# Patient Record
Sex: MALE | Race: WHITE | NOT HISPANIC OR LATINO
[De-identification: names, ages, dates, MRNs, and addresses within clinical notes are randomized per-mention and may not be internally consistent; named-entity substitution may affect disease eponyms.]

---

## 2017-11-03 PROBLEM — Z00.00 ENCOUNTER FOR PREVENTIVE HEALTH EXAMINATION: Status: ACTIVE | Noted: 2017-11-03

## 2017-11-10 ENCOUNTER — APPOINTMENT (OUTPATIENT)
Dept: CT IMAGING | Facility: IMAGING CENTER | Age: 82
End: 2017-11-10
Payer: MEDICARE

## 2017-11-10 ENCOUNTER — OUTPATIENT (OUTPATIENT)
Dept: OUTPATIENT SERVICES | Facility: HOSPITAL | Age: 82
LOS: 1 days | End: 2017-11-10
Payer: MEDICARE

## 2017-11-10 ENCOUNTER — APPOINTMENT (OUTPATIENT)
Dept: NUCLEAR MEDICINE | Facility: IMAGING CENTER | Age: 82
End: 2017-11-10
Payer: MEDICARE

## 2017-11-10 DIAGNOSIS — Z00.8 ENCOUNTER FOR OTHER GENERAL EXAMINATION: ICD-10-CM

## 2017-11-10 PROCEDURE — 78306 BONE IMAGING WHOLE BODY: CPT

## 2017-11-10 PROCEDURE — 74177 CT ABD & PELVIS W/CONTRAST: CPT | Mod: 26

## 2017-11-10 PROCEDURE — 78306 BONE IMAGING WHOLE BODY: CPT | Mod: 26

## 2017-11-10 PROCEDURE — 74177 CT ABD & PELVIS W/CONTRAST: CPT

## 2017-11-10 PROCEDURE — A9561: CPT

## 2022-04-12 ENCOUNTER — APPOINTMENT (OUTPATIENT)
Dept: ORTHOPEDIC SURGERY | Facility: CLINIC | Age: 87
End: 2022-04-12

## 2022-10-03 ENCOUNTER — NON-APPOINTMENT (OUTPATIENT)
Age: 87
End: 2022-10-03

## 2022-10-24 ENCOUNTER — NON-APPOINTMENT (OUTPATIENT)
Age: 87
End: 2022-10-24

## 2023-01-27 ENCOUNTER — INPATIENT (INPATIENT)
Facility: HOSPITAL | Age: 88
LOS: 13 days | Discharge: HOME HEALTH | DRG: 950 | End: 2023-02-10
Attending: INTERNAL MEDICINE | Admitting: STUDENT IN AN ORGANIZED HEALTH CARE EDUCATION/TRAINING PROGRAM
Payer: MEDICARE

## 2023-01-27 VITALS
HEART RATE: 100 BPM | OXYGEN SATURATION: 94 % | WEIGHT: 160.72 LBS | RESPIRATION RATE: 18 BRPM | TEMPERATURE: 100 F | DIASTOLIC BLOOD PRESSURE: 78 MMHG | HEIGHT: 65 IN | SYSTOLIC BLOOD PRESSURE: 132 MMHG

## 2023-01-27 DIAGNOSIS — M51.16 INTERVERTEBRAL DISC DISORDERS WITH RADICULOPATHY, LUMBAR REGION: ICD-10-CM

## 2023-01-27 LAB
GLUCOSE BLDC GLUCOMTR-MCNC: 174 MG/DL — HIGH (ref 70–99)
SARS-COV-2 RNA SPEC QL NAA+PROBE: SIGNIFICANT CHANGE UP

## 2023-01-27 RX ORDER — POLYETHYLENE GLYCOL 3350 17 G/17G
17 POWDER, FOR SOLUTION ORAL DAILY
Refills: 0 | Status: DISCONTINUED | OUTPATIENT
Start: 2023-01-27 | End: 2023-02-01

## 2023-01-27 RX ORDER — CHOLECALCIFEROL (VITAMIN D3) 125 MCG
2000 CAPSULE ORAL
Refills: 0 | Status: DISCONTINUED | OUTPATIENT
Start: 2023-01-27 | End: 2023-01-27

## 2023-01-27 RX ORDER — DEXTROSE 50 % IN WATER 50 %
12.5 SYRINGE (ML) INTRAVENOUS ONCE
Refills: 0 | Status: DISCONTINUED | OUTPATIENT
Start: 2023-01-27 | End: 2023-02-10

## 2023-01-27 RX ORDER — METOPROLOL TARTRATE 50 MG
25 TABLET ORAL
Refills: 0 | Status: DISCONTINUED | OUTPATIENT
Start: 2023-01-27 | End: 2023-01-27

## 2023-01-27 RX ORDER — DEXTROSE 50 % IN WATER 50 %
25 SYRINGE (ML) INTRAVENOUS ONCE
Refills: 0 | Status: DISCONTINUED | OUTPATIENT
Start: 2023-01-27 | End: 2023-02-10

## 2023-01-27 RX ORDER — ATORVASTATIN CALCIUM 80 MG/1
80 TABLET, FILM COATED ORAL AT BEDTIME
Refills: 0 | Status: DISCONTINUED | OUTPATIENT
Start: 2023-01-27 | End: 2023-02-10

## 2023-01-27 RX ORDER — INSULIN GLARGINE 100 [IU]/ML
9 INJECTION, SOLUTION SUBCUTANEOUS AT BEDTIME
Refills: 0 | Status: DISCONTINUED | OUTPATIENT
Start: 2023-01-27 | End: 2023-02-02

## 2023-01-27 RX ORDER — POLYETHYLENE GLYCOL 3350 17 G/17G
17 POWDER, FOR SOLUTION ORAL DAILY
Refills: 0 | Status: DISCONTINUED | OUTPATIENT
Start: 2023-01-27 | End: 2023-01-27

## 2023-01-27 RX ORDER — SODIUM CHLORIDE 9 MG/ML
1000 INJECTION, SOLUTION INTRAVENOUS
Refills: 0 | Status: DISCONTINUED | OUTPATIENT
Start: 2023-01-27 | End: 2023-02-10

## 2023-01-27 RX ORDER — OXYCODONE HYDROCHLORIDE 5 MG/1
5 TABLET ORAL EVERY 6 HOURS
Refills: 0 | Status: DISCONTINUED | OUTPATIENT
Start: 2023-01-27 | End: 2023-02-03

## 2023-01-27 RX ORDER — GLUCAGON INJECTION, SOLUTION 0.5 MG/.1ML
1 INJECTION, SOLUTION SUBCUTANEOUS ONCE
Refills: 0 | Status: DISCONTINUED | OUTPATIENT
Start: 2023-01-27 | End: 2023-02-10

## 2023-01-27 RX ORDER — LISINOPRIL 2.5 MG/1
10 TABLET ORAL DAILY
Refills: 0 | Status: DISCONTINUED | OUTPATIENT
Start: 2023-01-27 | End: 2023-01-27

## 2023-01-27 RX ORDER — TIZANIDINE 4 MG/1
2 TABLET ORAL EVERY 8 HOURS
Refills: 0 | Status: DISCONTINUED | OUTPATIENT
Start: 2023-01-27 | End: 2023-02-02

## 2023-01-27 RX ORDER — FAMOTIDINE 10 MG/ML
20 INJECTION INTRAVENOUS DAILY
Refills: 0 | Status: DISCONTINUED | OUTPATIENT
Start: 2023-01-27 | End: 2023-02-10

## 2023-01-27 RX ORDER — INSULIN LISPRO 100/ML
VIAL (ML) SUBCUTANEOUS AT BEDTIME
Refills: 0 | Status: DISCONTINUED | OUTPATIENT
Start: 2023-01-27 | End: 2023-02-02

## 2023-01-27 RX ORDER — ACETAMINOPHEN 500 MG
650 TABLET ORAL EVERY 6 HOURS
Refills: 0 | Status: DISCONTINUED | OUTPATIENT
Start: 2023-01-27 | End: 2023-02-10

## 2023-01-27 RX ORDER — SENNA PLUS 8.6 MG/1
2 TABLET ORAL AT BEDTIME
Refills: 0 | Status: DISCONTINUED | OUTPATIENT
Start: 2023-01-27 | End: 2023-02-06

## 2023-01-27 RX ORDER — INSULIN LISPRO 100/ML
VIAL (ML) SUBCUTANEOUS
Refills: 0 | Status: DISCONTINUED | OUTPATIENT
Start: 2023-01-27 | End: 2023-02-02

## 2023-01-27 RX ORDER — CHOLECALCIFEROL (VITAMIN D3) 125 MCG
2000 CAPSULE ORAL
Refills: 0 | Status: DISCONTINUED | OUTPATIENT
Start: 2023-01-27 | End: 2023-02-10

## 2023-01-27 RX ORDER — METOPROLOL TARTRATE 50 MG
25 TABLET ORAL
Refills: 0 | Status: DISCONTINUED | OUTPATIENT
Start: 2023-01-27 | End: 2023-02-09

## 2023-01-27 RX ORDER — LISINOPRIL 2.5 MG/1
10 TABLET ORAL DAILY
Refills: 0 | Status: DISCONTINUED | OUTPATIENT
Start: 2023-01-27 | End: 2023-02-10

## 2023-01-27 RX ORDER — LANOLIN ALCOHOL/MO/W.PET/CERES
3 CREAM (GRAM) TOPICAL AT BEDTIME
Refills: 0 | Status: DISCONTINUED | OUTPATIENT
Start: 2023-01-27 | End: 2023-01-31

## 2023-01-27 RX ORDER — HEPARIN SODIUM 5000 [USP'U]/ML
5000 INJECTION INTRAVENOUS; SUBCUTANEOUS EVERY 12 HOURS
Refills: 0 | Status: DISCONTINUED | OUTPATIENT
Start: 2023-01-27 | End: 2023-02-10

## 2023-01-27 RX ORDER — DEXTROSE 50 % IN WATER 50 %
15 SYRINGE (ML) INTRAVENOUS ONCE
Refills: 0 | Status: DISCONTINUED | OUTPATIENT
Start: 2023-01-27 | End: 2023-02-10

## 2023-01-27 RX ORDER — TAMSULOSIN HYDROCHLORIDE 0.4 MG/1
0.4 CAPSULE ORAL AT BEDTIME
Refills: 0 | Status: DISCONTINUED | OUTPATIENT
Start: 2023-01-27 | End: 2023-02-10

## 2023-01-27 RX ADMIN — Medication 25 MILLIGRAM(S): at 22:38

## 2023-01-27 RX ADMIN — TAMSULOSIN HYDROCHLORIDE 0.4 MILLIGRAM(S): 0.4 CAPSULE ORAL at 22:39

## 2023-01-27 RX ADMIN — ATORVASTATIN CALCIUM 80 MILLIGRAM(S): 80 TABLET, FILM COATED ORAL at 22:38

## 2023-01-27 RX ADMIN — OXYCODONE HYDROCHLORIDE 5 MILLIGRAM(S): 5 TABLET ORAL at 20:50

## 2023-01-27 RX ADMIN — OXYCODONE HYDROCHLORIDE 5 MILLIGRAM(S): 5 TABLET ORAL at 19:50

## 2023-01-27 RX ADMIN — SENNA PLUS 2 TABLET(S): 8.6 TABLET ORAL at 22:39

## 2023-01-27 RX ADMIN — INSULIN GLARGINE 9 UNIT(S): 100 INJECTION, SOLUTION SUBCUTANEOUS at 22:37

## 2023-01-27 NOTE — H&P ADULT - NS ATTEND AMEND GEN_ALL_CORE FT
87M with hx of HTN, CAD, prostate CA, DM2 and chronic LBP who presented with severe radicular LBP, now s/p L3-5 lumbar interbody fusion, L2-S1 posterior column osteotomy, facetectomy and foraminotomy and L3-5 PSF. Good candidate for acute rehab. Agree with plan as above. Continue Oxycodone, Tylenol and Zanaflex PRN for pain.

## 2023-01-27 NOTE — H&P ADULT - NSHPSOCIALHISTORY_GEN_ALL_CORE
Smoking -  EtOH -   Drugs -     Marital status:     Patient lives with spouse and son in a private house 3 JUSTICE  PTA: Independent in ADLs and ambulation     CURRENT FUNCTIONAL STATUS    Date: 1/27  Transfers -pt able to scoot forward and back in chair w cg of 1  sit--> stand --> Rw w min assist of 1,   stand --> sit w xg/min assist of 1, + cuing for technique and hand placement.     Ambulation - 1 x 50 ft w RW, min assist of 1, chair follow. Gait - antalgic, with decreased step length , unsteady and is notable for occasional instability L knee that pt is able to control by modifying step length of R LE.    OT:  01/26/2023  Rolling: Minimum assistance, Moderate assistance, of 2  Sit to Supine: Minimum assistance, Moderate assistance, of 2  Bed to Chair: Minimum assistance, of 2 with cues for hand placement    Armchair Transfers: Min assist, Of 2, Additional time, Adaptive equipment Smoking - Denies  EtOH - Denies  Drugs - Denies    Marital status:     Patient lives with spouse and son in a private house 3 JUSTICE  PTA: Independent in ADLs and ambulation     CURRENT FUNCTIONAL STATUS    Date: 1/27  Transfers -pt able to scoot forward and back in chair w cg of 1  sit--> stand --> Rw w min assist of 1,   stand --> sit w xg/min assist of 1, + cuing for technique and hand placement.     Ambulation - 1 x 50 ft w RW, min assist of 1, chair follow. Gait - antalgic, with decreased step length , unsteady and is notable for occasional instability L knee that pt is able to control by modifying step length of R LE.    OT:  01/26/2023  Rolling: Minimum assistance, Moderate assistance, of 2  Sit to Supine: Minimum assistance, Moderate assistance, of 2  Bed to Chair: Minimum assistance, of 2 with cues for hand placement    Armchair Transfers: Min assist, Of 2, Additional time, Adaptive equipment

## 2023-01-27 NOTE — H&P ADULT - NSHPREVIEWOFSYSTEMS_GEN_ALL_CORE
REVIEW OF SYSTEMS  Constitutional: No fever, No Chills, No fatigue  HEENT: No eye pain, No visual disturbances, No difficulty hearing  Pulm: No cough,  No shortness of breath  Cardio: No chest pain, No palpitations  GI:  No abdominal pain, No nausea, No vomiting, No diarrhea, + constipation  : No dysuria, No frequency, No hematuria  Neuro: No headaches, No memory loss, + loss of strength, no numbness, No tremors  Skin: No itching, No rashes, No lesions   Endo: No temperature intolerance  MSK: No joint pain, No joint swelling, + muscle pain, No Neck pain,  + back pain  Psych:  No depression, No anxiety

## 2023-01-27 NOTE — H&P ADULT - ASSESSMENT
ASSESSMENT/PLAN  This is an 86 YO male with PMH of HTN, HLD,  Nonobstructive CAD, prostate cancer s/p RT,  DM II, Arthritis s/p Right total knee replacement in 2008, squamous cell carcinoma of scalp, BPH, ASHD, Chronic lower back pain secondary to Lumbar stenosis with neurogenic claudication, COVID 2020 who presented to [facility] on 1/24 with c/o  lower back pain radiating down to B/L legs that has  progressively worsened and has failed conservation treatments.    MRI of lumbar spine revealed  Convex left curvature with straightening of the lordosis, multilevel loss of disc signal and height and anterior spurring and bulging from L2-L3 through L5-S1. He is s/p right posterior spine  L3-L5 extreme lateral lumbar interbody fusion, L2-S1 posterior column osteotomy, facetectomy, foraminotomy, L3-L5 posterior instrumented fusion  paraspineous muscle flap wound closure on 1/24.  Patient now with gait Instability, ADL impairments and Functional impairments.    #Lumbar Radiculopathy  - MRI of lumbar spine revealed  Convex left curvature with straightening of the lordosis, multilevel loss of disc signal and height and anterior spurring and bulging from L2-L3 through L5-S1.   - now s/p   L3-L5 extreme lateral lumbar interbody fusion, L2-S1 posterior column osteotomy, facetectomy, foraminotomy, L3-L5 posterior instrumented fusion  paraspineous muscle flap wound closure on 1/24.    - Start Comprehensive Rehab Program: PT/OT 3hours daily and 5 days weekly  - PT: Focused on improving strength, endurance, coordination, balance, functional mobility, and transfers  - OT: Focused on improving strength, fine motor skills, coordination, posture and ADLs.    - WB Status:    #DM II  - ISS and FS  - Admelog and Lantus  - A1c ..... on     #BPH  - Flomax 0.4mg daily    #Pain management  - Tylenol PRN, Oxycodone PRN    #DVT ppx  -  Heparin, SCD, TEDs    #GI ppx  - Pepcid 20mg    #Bowel Regimen  - Senna, miralax PRN    #Bladder management  - BS on admission, and q 8 hours (SC if > 400)  - Monitor UO    #FEN   - Diet: Regular. Cardiac  - Supplements:    #Skin:  - Skin on admission: ***  - Pressure injury/Skin: Turn Q2hrs while in bed, OOB to Chair, PT/OT     #Sleep:   - Maintain quiet hours and low stim environment.  - Melatonin PRN to maximize participation in therapy during the day.       #Precaution  - Fall, Aspiration, cardiac, Spinal    #GOC  CODE STATUS: FULL CODE     Outpatient Follow-up (Specialty/Name of physician):        MEDICAL PROGNOSIS: GOOD            REHAB POTENTIAL: GOOD             ESTIMATED DISPOSITION: HOME WITH HOME CARE            ELOS: 10-14 Days   EXPECTED THERAPY:     P.T. 1hr/day       O.T. 1hr/day      S.L.P. N/A    P&O Unnecessary     EXP FREQUENCY: 5 days per 7 day period     PRESCREEN COMPARISON:   I have reviewed the prescreen information and I have found no relevant changes between the preadmission screening and my post admission evaluation     RATIONALE FOR INPATIENT ADMISSION - Patient demonstrates the following: (check all that apply)  [X] Medically appropriate for rehabilitation admission  [X] Has attainable rehab goals with an appropriate initial discharge plan  [X] Has rehabilitation potential (expected to make a significant improvement within a reasonable period of time)   [X] Requires close medical management by a rehab physician, rehab nursing care, Hospitalist and comprehensive interdisciplinary team (including PT, OT, & or SLP, Prosthetics and Orthotics)   ASSESSMENT/PLAN  This is an 86 YO male with PMH of HTN, HLD,  Nonobstructive CAD, prostate cancer s/p RT,  DM II, Arthritis s/p Right total knee replacement in 2008, squamous cell carcinoma of scalp, BPH, ASHD, Chronic lower back pain secondary to Lumbar stenosis with neurogenic claudication, COVID 2020 who presented to [facility] on 1/24 with c/o  lower back pain radiating down to B/L legs that has  progressively worsened and has failed conservation treatments.    MRI of lumbar spine revealed  Convex left curvature with straightening of the lordosis, multilevel loss of disc signal and height and anterior spurring and bulging from L2-L3 through L5-S1. He is s/p right posterior spine  L3-L5 extreme lateral lumbar interbody fusion, L2-S1 posterior column osteotomy, facetectomy, foraminotomy, L3-L5 posterior instrumented fusion  paraspineous muscle flap wound closure on 1/24.  Patient now with gait Instability, ADL impairments and Functional impairments.    #Lumbar Radiculopathy  - MRI of lumbar spine revealed  Convex left curvature with straightening of the lordosis, multilevel loss of disc signal and height and anterior spurring and bulging from L2-L3 through L5-S1.   - now s/p   L3-L5 extreme lateral lumbar interbody fusion, L2-S1 posterior column osteotomy, facetectomy, foraminotomy, L3-L5 posterior instrumented fusion  paraspineous muscle flap wound closure on 1/24.  - Start Comprehensive Rehab Program: PT/OT 3hours daily and 5 days weekly  - PT: Focused on improving strength, endurance, coordination, balance, functional mobility, and transfers  - OT: Focused on improving strength, fine motor skills, coordination, posture and ADLs.      #HLD  - Lipitor 80mg daily    #HTN  - Lisinopril  10mg daily  - Metoprolol 25mg BID    #DM II  - ISS and FS  - Admelog and Lantus    #BPH  - Flomax 0.4mg daily    #Pain management  - Tylenol PRN, Oxycodone PRN, Zanaflex 2mg prn    #DVT ppx  -  Heparin, SCD, TEDs    #GI ppx  - Pepcid 20mg    #Bowel Regimen  - Senna, miralax daily    #Bladder management  - BS on admission, and q 8 hours (SC if > 400)  - Monitor UO    #FEN   - Diet: Regular. Cardiac  - Supplements vit D, calcium    #Skin:  - Skin on admission: Thoracic spine incision + 2 drain site with surgiglue, Right flank incision with surgiglue BRANDI  - Pressure injury/Skin: Turn Q2hrs while in bed, OOB to Chair, PT/OT     #Sleep:   - Maintain quiet hours and low stim environment.  - Melatonin 3mgN to maximize participation in therapy during the day.     #Precaution  - Fall, Aspiration, cardiac, thoracic spine    #GOC  CODE STATUS: FULL CODE     Outpatient Follow-up (Specialty/Name of physician):        MEDICAL PROGNOSIS: GOOD            REHAB POTENTIAL: GOOD             ESTIMATED DISPOSITION: HOME WITH HOME CARE            ELOS: 10-14 Days   EXPECTED THERAPY:     P.T. 1hr/day       O.T. 1hr/day      S.L.P. N/A    P&O Unnecessary     EXP FREQUENCY: 5 days per 7 day period     PRESCREEN COMPARISON:   I have reviewed the prescreen information and I have found no relevant changes between the preadmission screening and my post admission evaluation     RATIONALE FOR INPATIENT ADMISSION - Patient demonstrates the following: (check all that apply)  [X] Medically appropriate for rehabilitation admission  [X] Has attainable rehab goals with an appropriate initial discharge plan  [X] Has rehabilitation potential (expected to make a significant improvement within a reasonable period of time)   [X] Requires close medical management by a rehab physician, rehab nursing care, Hospitalist and comprehensive interdisciplinary team (including PT, OT, & or SLP, Prosthetics and Orthotics)

## 2023-01-27 NOTE — H&P ADULT - HISTORY OF PRESENT ILLNESS
This is an 88 YO male with PMH of HTN, HLD,  Nonobstructive CAD, prostate cancer s/p RT,  DM II, Arthritis s/p Right total knee replacement in 2008, squamous cell carcinoma of scalp, BPH, ASHD, Chronic lower back pain secondary to Lumbar stenosis with neurogenic claudication, COVID 2020. Pt reports he has chronic lower back pain radiating down to B/L legs (Left > right) for several years and progressively worsened with walking and ADLs in the past 6 months. Pt describes the pain as "sharp" rates at 9 out of 10. Conservative therapies including NSAIDs, steroid injections, physical therapy, ice/heat and rest have provided minimal relief from pain.    MRI of lumbar spine revealed  Convex left curvature with straightening of the lordosis, multilevel loss of disc signal and height and anterior spurring and bulging from L2-L3 through L5-S1. No vertebral body compression fracture.    01/24/2023, he had right posterior spine  L3-L5 extreme lateral lumbar interbody fusion, L2-S1 posterior column osteotomy, facetectomy, foraminotomy, L3-L5 posterior instrumented fusion    paraspineous muscle flap wound closure. He was extubated on 01/25/2023 and transitioned  to 3L N/C. Drain removed 01/27. Patient was evaluated by PM&R and therapy for functional deficits, gait/ADL impairments and acute rehabilitation was recommended. Patient was medically optimized for discharge to [facility] IRU on 1/27/23.

## 2023-01-27 NOTE — PATIENT PROFILE ADULT - FALL HARM RISK - HARM RISK INTERVENTIONS
Assistance with ambulation/Assistance OOB with selected safe patient handling equipment/Communicate Risk of Fall with Harm to all staff/Discuss with provider need for PT consult/Monitor gait and stability/Reinforce activity limits and safety measures with patient and family/Tailored Fall Risk Interventions/Visual Cue: Yellow wristband and red socks/Bed in lowest position, wheels locked, appropriate side rails in place/Call bell, personal items and telephone in reach/Instruct patient to call for assistance before getting out of bed or chair/Non-slip footwear when patient is out of bed/[place] to call system/Physically safe environment - no spills, clutter or unnecessary equipment/Purposeful Proactive Rounding/Room/bathroom lighting operational, light cord in reach

## 2023-01-27 NOTE — H&P ADULT - NSHPPHYSICALEXAM_GEN_ALL_CORE
PHYSICAL EXAM  VITALS  T(C): 37.5 (01-27-23 @ 18:40), Max: 37.5 (01-27-23 @ 18:40)  HR: 100 (01-27-23 @ 18:40) (100 - 100)  BP: 132/78 (01-27-23 @ 18:40) (132/78 - 132/78)  RR: 18 (01-27-23 @ 18:40) (18 - 18)  SpO2: 94% (01-27-23 @ 18:40) (94% - 94%)    Gen - NAD, Comfortable  HEENT - NCAT, EOMI, MMM  Neck - Supple, No limited ROM  Pulm - CTAB, No wheeze, No rhonchi, No crackles  Cardiovascular - RRR, S1S2  Chest - good chest expansion, good respiratory effort  Abdomen - Soft, NT/ND, +BS  Extremities - No Cyanosis, no clubbing, no edema, no calf tenderness  Neuro-     Cognitive - awake, alert, oriented to person, place, date, year, and situation.  Able to follow command     Communication - Fluent     Attention: Intact,      Judgement: Good evidence of judgement     Cranial Nerves - CN 2-12 intact.     Motor -                     LEFT    UE - 4+/5                    RIGHT UE - 4+/5                    LEFT    LE - 3-/5                    RIGHT LE - 3/5        Sensory - Intact to LT      Reflexes - DTR Intact, No primitive reflexive  Skin:  thoracic spine incision + 2 drain site with surgiglue, Right flank incision with surgiglue BRANDI

## 2023-01-28 LAB
A1C WITH ESTIMATED AVERAGE GLUCOSE RESULT: 7.6 % — HIGH (ref 4–5.6)
ALBUMIN SERPL ELPH-MCNC: 2.1 G/DL — LOW (ref 3.3–5)
ALP SERPL-CCNC: 63 U/L — SIGNIFICANT CHANGE UP (ref 40–120)
ALT FLD-CCNC: 16 U/L — SIGNIFICANT CHANGE UP (ref 10–45)
ANION GAP SERPL CALC-SCNC: 8 MMOL/L — SIGNIFICANT CHANGE UP (ref 5–17)
AST SERPL-CCNC: 35 U/L — SIGNIFICANT CHANGE UP (ref 10–40)
BASOPHILS # BLD AUTO: 0.02 K/UL — SIGNIFICANT CHANGE UP (ref 0–0.2)
BASOPHILS NFR BLD AUTO: 0.3 % — SIGNIFICANT CHANGE UP (ref 0–2)
BILIRUB SERPL-MCNC: 0.8 MG/DL — SIGNIFICANT CHANGE UP (ref 0.2–1.2)
BUN SERPL-MCNC: 12 MG/DL — SIGNIFICANT CHANGE UP (ref 7–23)
CALCIUM SERPL-MCNC: 8.3 MG/DL — LOW (ref 8.4–10.5)
CHLORIDE SERPL-SCNC: 105 MMOL/L — SIGNIFICANT CHANGE UP (ref 96–108)
CO2 SERPL-SCNC: 27 MMOL/L — SIGNIFICANT CHANGE UP (ref 22–31)
CREAT SERPL-MCNC: 0.7 MG/DL — SIGNIFICANT CHANGE UP (ref 0.5–1.3)
EGFR: 90 ML/MIN/1.73M2 — SIGNIFICANT CHANGE UP
EOSINOPHIL # BLD AUTO: 0.13 K/UL — SIGNIFICANT CHANGE UP (ref 0–0.5)
EOSINOPHIL NFR BLD AUTO: 2.1 % — SIGNIFICANT CHANGE UP (ref 0–6)
ESTIMATED AVERAGE GLUCOSE: 171 MG/DL — HIGH (ref 68–114)
GLUCOSE BLDC GLUCOMTR-MCNC: 145 MG/DL — HIGH (ref 70–99)
GLUCOSE BLDC GLUCOMTR-MCNC: 164 MG/DL — HIGH (ref 70–99)
GLUCOSE BLDC GLUCOMTR-MCNC: 175 MG/DL — HIGH (ref 70–99)
GLUCOSE BLDC GLUCOMTR-MCNC: 221 MG/DL — HIGH (ref 70–99)
GLUCOSE SERPL-MCNC: 158 MG/DL — HIGH (ref 70–99)
HCT VFR BLD CALC: 26.6 % — LOW (ref 39–50)
HGB BLD-MCNC: 8.4 G/DL — LOW (ref 13–17)
IMM GRANULOCYTES NFR BLD AUTO: 0.5 % — SIGNIFICANT CHANGE UP (ref 0–0.9)
LYMPHOCYTES # BLD AUTO: 0.78 K/UL — LOW (ref 1–3.3)
LYMPHOCYTES # BLD AUTO: 12.7 % — LOW (ref 13–44)
MCHC RBC-ENTMCNC: 30.4 PG — SIGNIFICANT CHANGE UP (ref 27–34)
MCHC RBC-ENTMCNC: 31.6 GM/DL — LOW (ref 32–36)
MCV RBC AUTO: 96.4 FL — SIGNIFICANT CHANGE UP (ref 80–100)
MONOCYTES # BLD AUTO: 0.57 K/UL — SIGNIFICANT CHANGE UP (ref 0–0.9)
MONOCYTES NFR BLD AUTO: 9.3 % — SIGNIFICANT CHANGE UP (ref 2–14)
NEUTROPHILS # BLD AUTO: 4.63 K/UL — SIGNIFICANT CHANGE UP (ref 1.8–7.4)
NEUTROPHILS NFR BLD AUTO: 75.1 % — SIGNIFICANT CHANGE UP (ref 43–77)
NRBC # BLD: 0 /100 WBCS — SIGNIFICANT CHANGE UP (ref 0–0)
PLATELET # BLD AUTO: 146 K/UL — LOW (ref 150–400)
POTASSIUM SERPL-MCNC: 3.7 MMOL/L — SIGNIFICANT CHANGE UP (ref 3.5–5.3)
POTASSIUM SERPL-SCNC: 3.7 MMOL/L — SIGNIFICANT CHANGE UP (ref 3.5–5.3)
PROT SERPL-MCNC: 5 G/DL — LOW (ref 6–8.3)
RBC # BLD: 2.76 M/UL — LOW (ref 4.2–5.8)
RBC # FLD: 17.7 % — HIGH (ref 10.3–14.5)
SODIUM SERPL-SCNC: 140 MMOL/L — SIGNIFICANT CHANGE UP (ref 135–145)
WBC # BLD: 6.16 K/UL — SIGNIFICANT CHANGE UP (ref 3.8–10.5)
WBC # FLD AUTO: 6.16 K/UL — SIGNIFICANT CHANGE UP (ref 3.8–10.5)

## 2023-01-28 PROCEDURE — 99223 1ST HOSP IP/OBS HIGH 75: CPT

## 2023-01-28 PROCEDURE — 99222 1ST HOSP IP/OBS MODERATE 55: CPT

## 2023-01-28 RX ADMIN — OXYCODONE HYDROCHLORIDE 5 MILLIGRAM(S): 5 TABLET ORAL at 13:08

## 2023-01-28 RX ADMIN — Medication 650 MILLIGRAM(S): at 23:06

## 2023-01-28 RX ADMIN — OXYCODONE HYDROCHLORIDE 5 MILLIGRAM(S): 5 TABLET ORAL at 14:08

## 2023-01-28 RX ADMIN — ATORVASTATIN CALCIUM 80 MILLIGRAM(S): 80 TABLET, FILM COATED ORAL at 22:14

## 2023-01-28 RX ADMIN — Medication 25 MILLIGRAM(S): at 06:52

## 2023-01-28 RX ADMIN — TAMSULOSIN HYDROCHLORIDE 0.4 MILLIGRAM(S): 0.4 CAPSULE ORAL at 22:15

## 2023-01-28 RX ADMIN — Medication 4: at 11:32

## 2023-01-28 RX ADMIN — Medication 650 MILLIGRAM(S): at 17:55

## 2023-01-28 RX ADMIN — Medication 25 MILLIGRAM(S): at 18:31

## 2023-01-28 RX ADMIN — Medication 1 ENEMA: at 22:16

## 2023-01-28 RX ADMIN — Medication 5 MILLIGRAM(S): at 10:21

## 2023-01-28 RX ADMIN — Medication 650 MILLIGRAM(S): at 16:55

## 2023-01-28 RX ADMIN — SENNA PLUS 2 TABLET(S): 8.6 TABLET ORAL at 23:06

## 2023-01-28 RX ADMIN — INSULIN GLARGINE 9 UNIT(S): 100 INJECTION, SOLUTION SUBCUTANEOUS at 22:14

## 2023-01-28 RX ADMIN — HEPARIN SODIUM 5000 UNIT(S): 5000 INJECTION INTRAVENOUS; SUBCUTANEOUS at 06:51

## 2023-01-28 RX ADMIN — HEPARIN SODIUM 5000 UNIT(S): 5000 INJECTION INTRAVENOUS; SUBCUTANEOUS at 18:13

## 2023-01-28 RX ADMIN — Medication 5 MILLIGRAM(S): at 18:12

## 2023-01-28 RX ADMIN — POLYETHYLENE GLYCOL 3350 17 GRAM(S): 17 POWDER, FOR SOLUTION ORAL at 11:31

## 2023-01-28 RX ADMIN — Medication 2: at 17:04

## 2023-01-28 RX ADMIN — LISINOPRIL 10 MILLIGRAM(S): 2.5 TABLET ORAL at 06:51

## 2023-01-28 RX ADMIN — FAMOTIDINE 20 MILLIGRAM(S): 10 INJECTION INTRAVENOUS at 11:31

## 2023-01-28 NOTE — CONSULT NOTE ADULT - ASSESSMENT
This is an 86 y/o M with PMH of HTN, HLD, Nonobstructive CAD, prostate cancer s/p RT, T2DM, Arthritis s/p Right total knee replacement in 2008, squamous cell carcinoma of scalp, BPH, ASHD, Chronic lower back pain secondary to Lumbar stenosis with neurogenic claudication, COVID 2020 who presented to [facility] on 1/24 with c/o lower back pain radiating down to B/L legs that has progressively worsened and has failed conservation treatments. MRI of lumbar spine revealed  Convex left curvature with straightening of the lordosis, multilevel loss of disc signal and height and anterior spurring and bulging from L2-L3 through L5-S1. He is s/p right posterior spine L3-L5 extreme lateral lumbar interbody fusion, L2-S1 posterior column osteotomy, facetectomy, foraminotomy, L3-L5 posterior instrumented fusion paraspinous muscle flap wound closure on 1/24. Patient now with gait Instability, ADL impairments and Functional impairments.    #Lumbar Radiculopathy  -MRI of lumbar spine revealed Convex left curvature with straightening of the lordosis, multilevel loss of disc signal and height and anterior spurring and bulging from L2-L3 through L5-S1  -Now s/p L3-L5 extreme lateral lumbar interbody fusion, L2-S1 posterior column osteotomy, facetectomy, foraminotomy, L3-L5 posterior instrumented fusion paraspinous muscle flap wound closure on 1/24  -Start comprehensive rehab program - PT/OT/SLP per rehab team  -Pain management, bowel regimen per rehab   -Hb 8.4, no active s/s bleeding, likely post op anemia, continue to monitor    #HLD  -Lipitor 80mg qd    #HTN  -Lisinopril 10mg qd  -Metoprolol 25mg BID    #DM II  -ISS and FS  -Lantus 9un qhs    #BPH  -Flomax 0.4mg qd    #DVT ppx - HSQ  #GI ppx - Pepcid

## 2023-01-28 NOTE — CONSULT NOTE ADULT - SUBJECTIVE AND OBJECTIVE BOX
Patient is a 87y old  Male who presents with a chief complaint of Lumbar Radiculopathy (2023 15:20)    HPI:  This is an 86 y/o M with PMH of HTN, HLD, Nonobstructive CAD, prostate cancer s/p RT, DM II, Arthritis s/p Right total knee replacement in , squamous cell carcinoma of scalp, BPH, ASHD, Chronic lower back pain secondary to Lumbar stenosis with neurogenic claudication, COVID-19 in . Pt reports he has chronic lower back pain radiating down to B/L legs (Left > right) for several years and progressively worsened with walking and ADLs in the past 6 months. Pt describes the pain as "sharp" rates at 9 out of 10. Conservative therapies including NSAIDs, steroid injections, physical therapy, ice/heat and rest have provided minimal relief from pain. MRI of lumbar spine revealed Convex left curvature with straightening of the lordosis, multilevel loss of disc signal and height and anterior spurring and bulging from L2-L3 through L5-S1. No vertebral body compression fracture.    2023, he had right posterior spine L3-L5 extreme lateral lumbar interbody fusion, L2-S1 posterior column osteotomy, facetectomy, foraminotomy, L3-L5 posterior instrumented fusion paraspineous muscle flap wound closure. He was extubated on 2023 and transitioned  to 3L N/C. Drain removed . Patient was evaluated by PM&R and therapy for functional deficits, gait/ADL impairments and acute rehabilitation was recommended. Patient was medically optimized for discharge to [facility] IRU on 23. (2023 15:20)    Patient seen and examined at bedside, stable, NAD. c/o constipation. denies headache, fever, chills, cp, sob, n/v, abd pain.    PAST MEDICAL & SURGICAL HISTORY: as above    SOCIAL HISTORY: Denies tobacco, EtOH, or illicit drug use. PTA ambulates with rolling walker. Lives in home with 3 stairs. Retired - worked with the [employer]    FAMILY HISTORY: Mother,  age 78, denies PMH. Father,  age 52, pneumonia    ALLERGIES:  clindamycin (Unknown)  gentamicin (Unknown)    MEDICATIONS  (STANDING):  atorvastatin 80 milliGRAM(s) Oral at bedtime  bisacodyl 5 milliGRAM(s) Oral every 12 hours  cholecalciferol 2000 Unit(s) Oral <User Schedule>  dextrose 5%. 1000 milliLiter(s) (50 mL/Hr) IV Continuous <Continuous>  dextrose 5%. 1000 milliLiter(s) (100 mL/Hr) IV Continuous <Continuous>  dextrose 50% Injectable 25 Gram(s) IV Push once  dextrose 50% Injectable 12.5 Gram(s) IV Push once  dextrose 50% Injectable 25 Gram(s) IV Push once  famotidine    Tablet 20 milliGRAM(s) Oral daily  glucagon  Injectable 1 milliGRAM(s) IntraMuscular once  heparin   Injectable 5000 Unit(s) SubCutaneous every 12 hours  insulin glargine Injectable (LANTUS) 9 Unit(s) SubCutaneous at bedtime  insulin lispro (ADMELOG) corrective regimen sliding scale   SubCutaneous three times a day before meals  insulin lispro (ADMELOG) corrective regimen sliding scale   SubCutaneous at bedtime  lisinopril 10 milliGRAM(s) Oral daily  metoprolol tartrate 25 milliGRAM(s) Oral two times a day  polyethylene glycol 3350 17 Gram(s) Oral daily  senna 2 Tablet(s) Oral at bedtime  tamsulosin 0.4 milliGRAM(s) Oral at bedtime    MEDICATIONS  (PRN):  acetaminophen     Tablet .. 650 milliGRAM(s) Oral every 6 hours PRN Temp greater or equal to 38C (100.4F), Mild Pain (1 - 3)  dextrose Oral Gel 15 Gram(s) Oral once PRN Blood Glucose LESS THAN 70 milliGRAM(s)/deciliter  melatonin 3 milliGRAM(s) Oral at bedtime PRN Insomnia  oxyCODONE    IR 5 milliGRAM(s) Oral every 6 hours PRN Moderate Pain (4 - 6)  saline laxative (FLEET) Rectal Enema 1 Enema Rectal once PRN constipation  tiZANidine 2 milliGRAM(s) Oral every 8 hours PRN Muscle Spasm    Review of Systems: Refer to HPI for pertinent positives and negatives. All other ROS reviewed and negative except as otherwise stated above.    Vital Signs Last 24 Hrs  T(F): 98.2 (2023 08:42), Max: 99.5 (2023 18:40)  HR: 93 (2023 08:42) (93 - 100)  BP: 118/72 (2023 08:42) (118/72 - 132/78)  RR: 16 (2023 08:42) (16 - 18)  SpO2: 96% (2023 08:42) (94% - 96%)  I&O's Summary    2023 07:01  -  2023 07:00  --------------------------------------------------------  IN: 0 mL / OUT: 300 mL / NET: -300 mL      PHYSICAL EXAM:  GENERAL: NAD, well-groomed, well-developed, pleasant, elderly  HEAD:  Atraumatic, Normocephalic  EYES: EOMI, PERRL, conjunctiva and sclera clear  ENMT: Moist mucous membranes, Good dentition  NECK: Supple, No JVD  CHEST/LUNG: Clear to auscultation bilaterally, non-labored breathing, good air entry  HEART: RRR; S1/S2  ABDOMEN: Soft, Nontender, Nondistended; Bowel sounds present  VASCULAR: Normal pulses, Normal capillary refill  EXTREMITIES: No cyanosis, No edema  LYMPH: No lymphadenopathy noted  SKIN: Warm, Intact  PSYCH: Normal mood and affect  NERVOUS SYSTEM:  A/O x3, Good concentration; No focal deficits, moves all extremities    LABS:                        8.4    6.16  )-----------( 146      ( 2023 06:30 )             26.6         140  |  105  |  12  ----------------------------<  158  3.7   |  27  |  0.70    Ca    8.3      2023 06:30    TPro  5.0  /  Alb  2.1  /  TBili  0.8  /  DBili  x   /  AST  35  /  ALT  16  /  AlkPhos  63                            POCT Blood Glucose.: 145 mg/dL (2023 08:18)  POCT Blood Glucose.: 174 mg/dL (2023 22:36)          COVID-19 PCR: NotDetec (23 @ 19:06)    RADIOLOGY & ADDITIONAL TESTS: reviewed   2023    Glucose: 132    Sodium: 140    Potassium: 3.6    Blood Urea Nitrogen : 13    Creatinine: 0.66      2023    WBC: 6.75    HgB: 8.6    Hct: 27.2    Platelets: 141     Cultures: Urine culture 2023:  No growth    Care Discussed with Consultants/Other Providers: yes, rehab

## 2023-01-29 LAB
GLUCOSE BLDC GLUCOMTR-MCNC: 158 MG/DL — HIGH (ref 70–99)
GLUCOSE BLDC GLUCOMTR-MCNC: 169 MG/DL — HIGH (ref 70–99)
GLUCOSE BLDC GLUCOMTR-MCNC: 184 MG/DL — HIGH (ref 70–99)
GLUCOSE BLDC GLUCOMTR-MCNC: 186 MG/DL — HIGH (ref 70–99)

## 2023-01-29 PROCEDURE — 99232 SBSQ HOSP IP/OBS MODERATE 35: CPT

## 2023-01-29 RX ORDER — LACTULOSE 10 G/15ML
10 SOLUTION ORAL ONCE
Refills: 0 | Status: DISCONTINUED | OUTPATIENT
Start: 2023-01-29 | End: 2023-02-02

## 2023-01-29 RX ADMIN — ATORVASTATIN CALCIUM 80 MILLIGRAM(S): 80 TABLET, FILM COATED ORAL at 22:02

## 2023-01-29 RX ADMIN — Medication 2: at 11:38

## 2023-01-29 RX ADMIN — Medication 5 MILLIGRAM(S): at 17:03

## 2023-01-29 RX ADMIN — HEPARIN SODIUM 5000 UNIT(S): 5000 INJECTION INTRAVENOUS; SUBCUTANEOUS at 17:03

## 2023-01-29 RX ADMIN — HEPARIN SODIUM 5000 UNIT(S): 5000 INJECTION INTRAVENOUS; SUBCUTANEOUS at 05:03

## 2023-01-29 RX ADMIN — LISINOPRIL 10 MILLIGRAM(S): 2.5 TABLET ORAL at 05:02

## 2023-01-29 RX ADMIN — Medication 3 MILLIGRAM(S): at 22:02

## 2023-01-29 RX ADMIN — OXYCODONE HYDROCHLORIDE 5 MILLIGRAM(S): 5 TABLET ORAL at 10:37

## 2023-01-29 RX ADMIN — POLYETHYLENE GLYCOL 3350 17 GRAM(S): 17 POWDER, FOR SOLUTION ORAL at 11:38

## 2023-01-29 RX ADMIN — Medication 2: at 17:03

## 2023-01-29 RX ADMIN — INSULIN GLARGINE 9 UNIT(S): 100 INJECTION, SOLUTION SUBCUTANEOUS at 22:01

## 2023-01-29 RX ADMIN — Medication 5 MILLIGRAM(S): at 05:03

## 2023-01-29 RX ADMIN — OXYCODONE HYDROCHLORIDE 5 MILLIGRAM(S): 5 TABLET ORAL at 18:08

## 2023-01-29 RX ADMIN — OXYCODONE HYDROCHLORIDE 5 MILLIGRAM(S): 5 TABLET ORAL at 11:37

## 2023-01-29 RX ADMIN — Medication 2: at 07:57

## 2023-01-29 RX ADMIN — Medication 25 MILLIGRAM(S): at 05:02

## 2023-01-29 RX ADMIN — Medication 1 ENEMA: at 18:05

## 2023-01-29 RX ADMIN — FAMOTIDINE 20 MILLIGRAM(S): 10 INJECTION INTRAVENOUS at 11:38

## 2023-01-29 RX ADMIN — Medication 650 MILLIGRAM(S): at 23:02

## 2023-01-29 RX ADMIN — Medication 25 MILLIGRAM(S): at 17:04

## 2023-01-29 RX ADMIN — OXYCODONE HYDROCHLORIDE 5 MILLIGRAM(S): 5 TABLET ORAL at 17:03

## 2023-01-29 RX ADMIN — Medication 650 MILLIGRAM(S): at 00:06

## 2023-01-29 RX ADMIN — TAMSULOSIN HYDROCHLORIDE 0.4 MILLIGRAM(S): 0.4 CAPSULE ORAL at 22:02

## 2023-01-29 RX ADMIN — Medication 650 MILLIGRAM(S): at 22:02

## 2023-01-29 NOTE — PROGRESS NOTE ADULT - SUBJECTIVE AND OBJECTIVE BOX
Cc: Gait dysfunction    HPI: Patient seen and examined at bedside. No acute events overnight. Does complain of constipation.   Pain controlled, no chest pain, no N/V, no Fevers/Chills. No other new ROS  Has been tolerating rehabilitation program.    acetaminophen     Tablet .. 650 milliGRAM(s) Oral every 6 hours PRN  atorvastatin 80 milliGRAM(s) Oral at bedtime  bisacodyl 5 milliGRAM(s) Oral every 12 hours  bisacodyl Suppository 10 milliGRAM(s) Rectal once  cholecalciferol 2000 Unit(s) Oral <User Schedule>  dextrose 5%. 1000 milliLiter(s) IV Continuous <Continuous>  dextrose 5%. 1000 milliLiter(s) IV Continuous <Continuous>  dextrose 50% Injectable 25 Gram(s) IV Push once  dextrose 50% Injectable 12.5 Gram(s) IV Push once  dextrose 50% Injectable 25 Gram(s) IV Push once  dextrose Oral Gel 15 Gram(s) Oral once PRN  famotidine    Tablet 20 milliGRAM(s) Oral daily  glucagon  Injectable 1 milliGRAM(s) IntraMuscular once  heparin   Injectable 5000 Unit(s) SubCutaneous every 12 hours  insulin glargine Injectable (LANTUS) 9 Unit(s) SubCutaneous at bedtime  insulin lispro (ADMELOG) corrective regimen sliding scale   SubCutaneous three times a day before meals  insulin lispro (ADMELOG) corrective regimen sliding scale   SubCutaneous at bedtime  lactulose Syrup 10 Gram(s) Oral once  lisinopril 10 milliGRAM(s) Oral daily  melatonin 3 milliGRAM(s) Oral at bedtime PRN  metoprolol tartrate 25 milliGRAM(s) Oral two times a day  oxyCODONE    IR 5 milliGRAM(s) Oral every 6 hours PRN  polyethylene glycol 3350 17 Gram(s) Oral daily  saline laxative (FLEET) Rectal Enema 1 Enema Rectal once PRN  senna 2 Tablet(s) Oral at bedtime  tamsulosin 0.4 milliGRAM(s) Oral at bedtime  tiZANidine 2 milliGRAM(s) Oral every 8 hours PRN      T(C): 36.6 (01-29-23 @ 07:55), Max: 37.1 (01-28-23 @ 19:46)  HR: 78 (01-29-23 @ 07:55) (78 - 101)  BP: 132/80 (01-29-23 @ 07:55) (108/68 - 132/80)  RR: 16 (01-29-23 @ 07:55) (16 - 16)  SpO2: 97% (01-29-23 @ 07:55) (95% - 97%)    CBC 1/28/23 reviewed by me: Hb 8.4  CMP 1/28/23 reviewed by me: BUN/Crt 12/0.70    In NAD  HEENT- EOMI  Heart- S1S2  Lungs- CTA bl.  Abd- + BS, NT  Ext- No calf pain  Neuro- Exam unchanged  Skin - lumbar dressing c/d/i      Imp: Patient with diagnosis of lumbar radiculopathy s/p L3-5 PSF, HTN, DM2, BPH admitted for comprehensive acute rehabilitation.    Plan:  - Continue PT/OT/SLP as indicated  - DVT prophylaxis - Continue subQ Heparin  - Skin- Turn q2h, check skin daily  - Continue current medications  -Active issues-   Constipation - Will give suppository and lactulose  HTN - Continue Lisinopril and Metoprolol  Pain - continue tizanidine PRN, Oxycodone PRN  - Patient is stable to continue current rehabilitation program.   - CBC, BMP in AM

## 2023-01-29 NOTE — PROGRESS NOTE ADULT - ASSESSMENT
This is an 88 y/o M with PMH of HTN, HLD, Nonobstructive CAD, prostate cancer s/p RT, T2DM, Arthritis s/p Right total knee replacement in 2008, squamous cell carcinoma of scalp, BPH, ASHD, Chronic lower back pain secondary to Lumbar stenosis with neurogenic claudication, COVID 2020 who presented to [facility] on 1/24 with c/o lower back pain radiating down to B/L legs that has progressively worsened and has failed conservation treatments. MRI of lumbar spine revealed  Convex left curvature with straightening of the lordosis, multilevel loss of disc signal and height and anterior spurring and bulging from L2-L3 through L5-S1. He is s/p right posterior spine L3-L5 extreme lateral lumbar interbody fusion, L2-S1 posterior column osteotomy, facetectomy, foraminotomy, L3-L5 posterior instrumented fusion paraspinous muscle flap wound closure on 1/24. Patient now with gait Instability, ADL impairments and Functional impairments.    #Lumbar Radiculopathy  -MRI of lumbar spine revealed Convex left curvature with straightening of the lordosis, multilevel loss of disc signal and height and anterior spurring and bulging from L2-L3 through L5-S1  -Now s/p L3-L5 extreme lateral lumbar interbody fusion, L2-S1 posterior column osteotomy, facetectomy, foraminotomy, L3-L5 posterior instrumented fusion paraspinous muscle flap wound closure on 1/24  -Start comprehensive rehab program - PT/OT/SLP per rehab team  -Pain management, bowel regimen per rehab   -Hb 8.4, no active s/s bleeding, likely post op anemia, continue to monitor    #HLD  -Lipitor 80mg qd    #HTN  -Lisinopril 10mg qd  -Metoprolol 25mg BID    #DM II  -ISS and FS  -Lantus 9un qhs    #BPH  -Flomax 0.4mg qd    #DVT ppx - HSQ  #GI ppx - Pepcid

## 2023-01-29 NOTE — PROGRESS NOTE ADULT - SUBJECTIVE AND OBJECTIVE BOX
Patient is a 87y old  Male who presents with a chief complaint of Lumbar Radiculopathy (28 Jan 2023 09:16)      Patient seen and examined at bedside. feeling well, still constipated. denies acute medical complaints    ALLERGIES:  clindamycin (Unknown)  gentamicin (Unknown)    MEDICATIONS  (STANDING):  atorvastatin 80 milliGRAM(s) Oral at bedtime  bisacodyl 5 milliGRAM(s) Oral every 12 hours  cholecalciferol 2000 Unit(s) Oral <User Schedule>  dextrose 5%. 1000 milliLiter(s) (100 mL/Hr) IV Continuous <Continuous>  dextrose 5%. 1000 milliLiter(s) (50 mL/Hr) IV Continuous <Continuous>  dextrose 50% Injectable 25 Gram(s) IV Push once  dextrose 50% Injectable 12.5 Gram(s) IV Push once  dextrose 50% Injectable 25 Gram(s) IV Push once  famotidine    Tablet 20 milliGRAM(s) Oral daily  glucagon  Injectable 1 milliGRAM(s) IntraMuscular once  heparin   Injectable 5000 Unit(s) SubCutaneous every 12 hours  insulin glargine Injectable (LANTUS) 9 Unit(s) SubCutaneous at bedtime  insulin lispro (ADMELOG) corrective regimen sliding scale   SubCutaneous three times a day before meals  insulin lispro (ADMELOG) corrective regimen sliding scale   SubCutaneous at bedtime  lisinopril 10 milliGRAM(s) Oral daily  metoprolol tartrate 25 milliGRAM(s) Oral two times a day  polyethylene glycol 3350 17 Gram(s) Oral daily  senna 2 Tablet(s) Oral at bedtime  tamsulosin 0.4 milliGRAM(s) Oral at bedtime    MEDICATIONS  (PRN):  acetaminophen     Tablet .. 650 milliGRAM(s) Oral every 6 hours PRN Temp greater or equal to 38C (100.4F), Mild Pain (1 - 3)  dextrose Oral Gel 15 Gram(s) Oral once PRN Blood Glucose LESS THAN 70 milliGRAM(s)/deciliter  melatonin 3 milliGRAM(s) Oral at bedtime PRN Insomnia  oxyCODONE    IR 5 milliGRAM(s) Oral every 6 hours PRN Moderate Pain (4 - 6)  tiZANidine 2 milliGRAM(s) Oral every 8 hours PRN Muscle Spasm    Vital Signs Last 24 Hrs  T(F): 97.8 (29 Jan 2023 07:55), Max: 98.7 (28 Jan 2023 19:46)  HR: 78 (29 Jan 2023 07:55) (78 - 101)  BP: 132/80 (29 Jan 2023 07:55) (108/68 - 132/80)  RR: 16 (29 Jan 2023 07:55) (16 - 16)  SpO2: 97% (29 Jan 2023 07:55) (95% - 97%)  I&O's Summary    BMI (kg/m2): 26.7 (01-27-23 @ 18:40)    PHYSICAL EXAM:  General: NAD, A/O x 3  ENT: MMM, no scleral icterus  Neck: Supple, No JVD  Lungs: Clear to auscultation bilaterally, no wheezes, rales, rhonchi  Cardio: RRR, S1/S2  Abdomen: Soft, Nontender, Nondistended; Bowel sounds present  Extremities: No calf tenderness, No pitting edema  Skin: lumbar dressing c/d/i     LABS:                        8.4    6.16  )-----------( 146      ( 28 Jan 2023 06:30 )             26.6       01-28    140  |  105  |  12  ----------------------------<  158  3.7   |  27  |  0.70    Ca    8.3      28 Jan 2023 06:30    TPro  5.0  /  Alb  2.1  /  TBili  0.8  /  DBili  x   /  AST  35  /  ALT  16  /  AlkPhos  63  01-28                              POCT Blood Glucose.: 158 mg/dL (29 Jan 2023 07:47)  POCT Blood Glucose.: 164 mg/dL (28 Jan 2023 22:13)  POCT Blood Glucose.: 175 mg/dL (28 Jan 2023 16:54)  POCT Blood Glucose.: 221 mg/dL (28 Jan 2023 11:28)          COVID-19 PCR: NotDetec (01-27-23 @ 19:06)      RADIOLOGY & ADDITIONAL TESTS: reviewed    Care Discussed with Consultants/Other Providers: yes, rehab

## 2023-01-30 LAB
ALBUMIN SERPL ELPH-MCNC: 2.2 G/DL — LOW (ref 3.3–5)
ALP SERPL-CCNC: 69 U/L — SIGNIFICANT CHANGE UP (ref 40–120)
ALT FLD-CCNC: 13 U/L — SIGNIFICANT CHANGE UP (ref 10–45)
ANION GAP SERPL CALC-SCNC: 7 MMOL/L — SIGNIFICANT CHANGE UP (ref 5–17)
AST SERPL-CCNC: 37 U/L — SIGNIFICANT CHANGE UP (ref 10–40)
BILIRUB SERPL-MCNC: 0.8 MG/DL — SIGNIFICANT CHANGE UP (ref 0.2–1.2)
BUN SERPL-MCNC: 12 MG/DL — SIGNIFICANT CHANGE UP (ref 7–23)
CALCIUM SERPL-MCNC: 8.1 MG/DL — LOW (ref 8.4–10.5)
CHLORIDE SERPL-SCNC: 105 MMOL/L — SIGNIFICANT CHANGE UP (ref 96–108)
CO2 SERPL-SCNC: 29 MMOL/L — SIGNIFICANT CHANGE UP (ref 22–31)
CREAT SERPL-MCNC: 0.76 MG/DL — SIGNIFICANT CHANGE UP (ref 0.5–1.3)
EGFR: 88 ML/MIN/1.73M2 — SIGNIFICANT CHANGE UP
GLUCOSE BLDC GLUCOMTR-MCNC: 154 MG/DL — HIGH (ref 70–99)
GLUCOSE BLDC GLUCOMTR-MCNC: 161 MG/DL — HIGH (ref 70–99)
GLUCOSE BLDC GLUCOMTR-MCNC: 168 MG/DL — HIGH (ref 70–99)
GLUCOSE BLDC GLUCOMTR-MCNC: 172 MG/DL — HIGH (ref 70–99)
GLUCOSE SERPL-MCNC: 148 MG/DL — HIGH (ref 70–99)
HCT VFR BLD CALC: 26.9 % — LOW (ref 39–50)
HGB BLD-MCNC: 8.6 G/DL — LOW (ref 13–17)
MCHC RBC-ENTMCNC: 30.4 PG — SIGNIFICANT CHANGE UP (ref 27–34)
MCHC RBC-ENTMCNC: 32 GM/DL — SIGNIFICANT CHANGE UP (ref 32–36)
MCV RBC AUTO: 95.1 FL — SIGNIFICANT CHANGE UP (ref 80–100)
NRBC # BLD: 0 /100 WBCS — SIGNIFICANT CHANGE UP (ref 0–0)
PLATELET # BLD AUTO: 190 K/UL — SIGNIFICANT CHANGE UP (ref 150–400)
POTASSIUM SERPL-MCNC: 3.8 MMOL/L — SIGNIFICANT CHANGE UP (ref 3.5–5.3)
POTASSIUM SERPL-SCNC: 3.8 MMOL/L — SIGNIFICANT CHANGE UP (ref 3.5–5.3)
PROT SERPL-MCNC: 5.2 G/DL — LOW (ref 6–8.3)
RBC # BLD: 2.83 M/UL — LOW (ref 4.2–5.8)
RBC # FLD: 16.6 % — HIGH (ref 10.3–14.5)
SODIUM SERPL-SCNC: 141 MMOL/L — SIGNIFICANT CHANGE UP (ref 135–145)
WBC # BLD: 5.64 K/UL — SIGNIFICANT CHANGE UP (ref 3.8–10.5)
WBC # FLD AUTO: 5.64 K/UL — SIGNIFICANT CHANGE UP (ref 3.8–10.5)

## 2023-01-30 PROCEDURE — 99232 SBSQ HOSP IP/OBS MODERATE 35: CPT

## 2023-01-30 RX ADMIN — LISINOPRIL 10 MILLIGRAM(S): 2.5 TABLET ORAL at 05:48

## 2023-01-30 RX ADMIN — Medication 2: at 16:41

## 2023-01-30 RX ADMIN — Medication 5 MILLIGRAM(S): at 17:38

## 2023-01-30 RX ADMIN — Medication 2: at 11:57

## 2023-01-30 RX ADMIN — Medication 25 MILLIGRAM(S): at 05:48

## 2023-01-30 RX ADMIN — FAMOTIDINE 20 MILLIGRAM(S): 10 INJECTION INTRAVENOUS at 11:57

## 2023-01-30 RX ADMIN — ATORVASTATIN CALCIUM 80 MILLIGRAM(S): 80 TABLET, FILM COATED ORAL at 22:08

## 2023-01-30 RX ADMIN — Medication 2000 UNIT(S): at 08:04

## 2023-01-30 RX ADMIN — HEPARIN SODIUM 5000 UNIT(S): 5000 INJECTION INTRAVENOUS; SUBCUTANEOUS at 17:38

## 2023-01-30 RX ADMIN — Medication 5 MILLIGRAM(S): at 05:48

## 2023-01-30 RX ADMIN — Medication 2: at 08:04

## 2023-01-30 RX ADMIN — INSULIN GLARGINE 9 UNIT(S): 100 INJECTION, SOLUTION SUBCUTANEOUS at 22:08

## 2023-01-30 RX ADMIN — HEPARIN SODIUM 5000 UNIT(S): 5000 INJECTION INTRAVENOUS; SUBCUTANEOUS at 05:48

## 2023-01-30 RX ADMIN — POLYETHYLENE GLYCOL 3350 17 GRAM(S): 17 POWDER, FOR SOLUTION ORAL at 11:57

## 2023-01-30 RX ADMIN — SENNA PLUS 2 TABLET(S): 8.6 TABLET ORAL at 22:07

## 2023-01-30 RX ADMIN — TAMSULOSIN HYDROCHLORIDE 0.4 MILLIGRAM(S): 0.4 CAPSULE ORAL at 22:07

## 2023-01-30 NOTE — DIETITIAN INITIAL EVALUATION ADULT - PERTINENT LABORATORY DATA
01-30    141  |  105  |  12  ----------------------------<  148<H>  3.8   |  29  |  0.76    Ca    8.1<L>      30 Jan 2023 06:07    TPro  5.2<L>  /  Alb  2.2<L>  /  TBili  0.8  /  DBili  x   /  AST  37  /  ALT  13  /  AlkPhos  69  01-30  POCT Blood Glucose.: 161 mg/dL (01-30-23 @ 11:55)  A1C with Estimated Average Glucose Result: 7.6 % (01-28-23 @ 06:30)

## 2023-01-30 NOTE — PROGRESS NOTE ADULT - SUBJECTIVE AND OBJECTIVE BOX
CC: Lumbar Radiculopathy    Today's Subjective & Objective Findings:  Patient seen and examined at bedside.  States that he had a good weekend, and slept well last night.  Intermittent back pain from recent surgery, but comfortable during encounter.   Last BM on 1/29.  No other complaints.    Denies headache, dizziness, visual changes, chest pain, SOB/LUNA, abdominal pain, nausea, vomiting, diarrhea, dysuria, numbness or tingling.    Therapy-- engaging, motivated.  Pain is a limiting factor.    Vital Signs Last 24 Hrs  T(C): 37 (30 Jan 2023 09:02), Max: 37 (29 Jan 2023 21:50)  T(F): 98.6 (30 Jan 2023 09:02), Max: 98.6 (29 Jan 2023 21:50)  HR: 87 (30 Jan 2023 09:02) (81 - 98)  BP: 114/74 (30 Jan 2023 09:02) (112/69 - 123/75)  BP(mean): --  RR: 16 (30 Jan 2023 09:02) (16 - 16)  SpO2: 97% (30 Jan 2023 09:02) (95% - 97%)      PHYSICAL EXAM:  Gen - NAD, Comfortable  HEENT - NCAT, EOMI, MMM  Neck - Supple, No limited ROM  Pulm - CTAB, No wheeze, No rhonchi, No crackles  Cardiovascular - RRR, S1S2  Chest - good chest expansion, good respiratory effort  Abdomen - Soft, NT/ND, +BS  Extremities - No Cyanosis, no clubbing, no edema, no calf tenderness  Neuro-     Cognitive - awake, alert, oriented to person, place, date, year, and situation.  Able to follow command     Communication - Fluent     Attention: Intact,      Judgement: Good evidence of judgement     Cranial Nerves - CN 2-12 intact.     Motor -                     LEFT    UE - 4+/5                    RIGHT UE - 4+/5                    LEFT    LE - 3-/5                    RIGHT LE - 3/5        Sensory - Intact to LT      Reflexes - DTR Intact, No primitive reflexive  Skin:  thoracic spine incision + 2 drain site with surgiglue, Right flank incision with surgiglue BRANDI      LAB                        8.6    5.64  )-----------( 190      ( 30 Jan 2023 06:07 )             26.9     01-30    141  |  105  |  12  ----------------------------<  148<H>  3.8   |  29  |  0.76    Ca    8.1<L>      30 Jan 2023 06:07    TPro  5.2<L>  /  Alb  2.2<L>  /  TBili  0.8  /  DBili  x   /  AST  37  /  ALT  13  /  AlkPhos  69  01-30    LIVER FUNCTIONS - ( 30 Jan 2023 06:07 )  Alb: 2.2 g/dL / Pro: 5.2 g/dL / ALK PHOS: 69 U/L / ALT: 13 U/L / AST: 37 U/L / GGT: x             MEDICATIONS  (STANDING):  atorvastatin 80 milliGRAM(s) Oral at bedtime  bisacodyl 5 milliGRAM(s) Oral every 12 hours  bisacodyl Suppository 10 milliGRAM(s) Rectal once  cholecalciferol 2000 Unit(s) Oral <User Schedule>  dextrose 5%. 1000 milliLiter(s) (50 mL/Hr) IV Continuous <Continuous>  dextrose 5%. 1000 milliLiter(s) (100 mL/Hr) IV Continuous <Continuous>  dextrose 50% Injectable 25 Gram(s) IV Push once  dextrose 50% Injectable 12.5 Gram(s) IV Push once  dextrose 50% Injectable 25 Gram(s) IV Push once  famotidine    Tablet 20 milliGRAM(s) Oral daily  glucagon  Injectable 1 milliGRAM(s) IntraMuscular once  heparin   Injectable 5000 Unit(s) SubCutaneous every 12 hours  insulin glargine Injectable (LANTUS) 9 Unit(s) SubCutaneous at bedtime  insulin lispro (ADMELOG) corrective regimen sliding scale   SubCutaneous three times a day before meals  insulin lispro (ADMELOG) corrective regimen sliding scale   SubCutaneous at bedtime  lactulose Syrup 10 Gram(s) Oral once  lisinopril 10 milliGRAM(s) Oral daily  metoprolol tartrate 25 milliGRAM(s) Oral two times a day  polyethylene glycol 3350 17 Gram(s) Oral daily  senna 2 Tablet(s) Oral at bedtime  tamsulosin 0.4 milliGRAM(s) Oral at bedtime    MEDICATIONS  (PRN):  acetaminophen     Tablet .. 650 milliGRAM(s) Oral every 6 hours PRN Temp greater or equal to 38C (100.4F), Mild Pain (1 - 3)  dextrose Oral Gel 15 Gram(s) Oral once PRN Blood Glucose LESS THAN 70 milliGRAM(s)/deciliter  melatonin 3 milliGRAM(s) Oral at bedtime PRN Insomnia  oxyCODONE    IR 5 milliGRAM(s) Oral every 6 hours PRN Moderate Pain (4 - 6)  tiZANidine 2 milliGRAM(s) Oral every 8 hours PRN Muscle Spasm

## 2023-01-30 NOTE — DIETITIAN INITIAL EVALUATION ADULT - ORAL INTAKE PTA/DIET HISTORY
PTA patient w/ reduced appetite/intake x 1 year w/ 30lb weight loss 2/2 pain. At times monitors carbohydrates.

## 2023-01-30 NOTE — PROGRESS NOTE ADULT - SUBJECTIVE AND OBJECTIVE BOX
Medicine Progress Note    Patient is a 87y old  Male who presents with a chief complaint of Intervertebral disc disorder with radiculopathy of lumbar region     (30 Jan 2023 13:41)      SUBJECTIVE / OVERNIGHT EVENTS:  seen and examined  Chart reviewed  No overnight events  Limb weakness improving with therapy  BM+  back pain controlled with meds  No complaints    ADDITIONAL REVIEW OF SYSTEMS:  no fever/chills/CP/sob/palpitation/dizziness/ abd pain/nausea/vomiting/diarrhea/constipation/headaches. all other ROS neg    MEDICATIONS  (STANDING):  atorvastatin 80 milliGRAM(s) Oral at bedtime  bisacodyl 5 milliGRAM(s) Oral every 12 hours  bisacodyl Suppository 10 milliGRAM(s) Rectal once  cholecalciferol 2000 Unit(s) Oral <User Schedule>  dextrose 5%. 1000 milliLiter(s) (50 mL/Hr) IV Continuous <Continuous>  dextrose 5%. 1000 milliLiter(s) (100 mL/Hr) IV Continuous <Continuous>  dextrose 50% Injectable 25 Gram(s) IV Push once  dextrose 50% Injectable 12.5 Gram(s) IV Push once  dextrose 50% Injectable 25 Gram(s) IV Push once  famotidine    Tablet 20 milliGRAM(s) Oral daily  glucagon  Injectable 1 milliGRAM(s) IntraMuscular once  heparin   Injectable 5000 Unit(s) SubCutaneous every 12 hours  insulin glargine Injectable (LANTUS) 9 Unit(s) SubCutaneous at bedtime  insulin lispro (ADMELOG) corrective regimen sliding scale   SubCutaneous three times a day before meals  insulin lispro (ADMELOG) corrective regimen sliding scale   SubCutaneous at bedtime  lactulose Syrup 10 Gram(s) Oral once  lisinopril 10 milliGRAM(s) Oral daily  metoprolol tartrate 25 milliGRAM(s) Oral two times a day  polyethylene glycol 3350 17 Gram(s) Oral daily  senna 2 Tablet(s) Oral at bedtime  tamsulosin 0.4 milliGRAM(s) Oral at bedtime    MEDICATIONS  (PRN):  acetaminophen     Tablet .. 650 milliGRAM(s) Oral every 6 hours PRN Temp greater or equal to 38C (100.4F), Mild Pain (1 - 3)  dextrose Oral Gel 15 Gram(s) Oral once PRN Blood Glucose LESS THAN 70 milliGRAM(s)/deciliter  melatonin 3 milliGRAM(s) Oral at bedtime PRN Insomnia  oxyCODONE    IR 5 milliGRAM(s) Oral every 6 hours PRN Moderate Pain (4 - 6)  tiZANidine 2 milliGRAM(s) Oral every 8 hours PRN Muscle Spasm    CAPILLARY BLOOD GLUCOSE      POCT Blood Glucose.: 161 mg/dL (30 Jan 2023 11:55)  POCT Blood Glucose.: 154 mg/dL (30 Jan 2023 08:02)  POCT Blood Glucose.: 186 mg/dL (29 Jan 2023 21:59)  POCT Blood Glucose.: 184 mg/dL (29 Jan 2023 16:59)    I&O's Summary      PHYSICAL EXAM:  Vital Signs Last 24 Hrs  T(C): 37 (30 Jan 2023 09:02), Max: 37 (29 Jan 2023 21:50)  T(F): 98.6 (30 Jan 2023 09:02), Max: 98.6 (29 Jan 2023 21:50)  HR: 87 (30 Jan 2023 09:02) (81 - 98)  BP: 114/74 (30 Jan 2023 09:02) (112/69 - 123/75)  BP(mean): --  RR: 16 (30 Jan 2023 09:02) (16 - 16)  SpO2: 97% (30 Jan 2023 09:02) (95% - 97%)    Parameters below as of 30 Jan 2023 09:02  Patient On (Oxygen Delivery Method): room air    GENERAL: Not in distress. Alert    HEENT: AT/NC. clear conjuctiva, MMM.   no pallor or icterus  CARDIOVASCULAR: RRR S1, S2. No murmur/rubs/gallop  LUNGS: BLAE+, no rales, no wheezing, no rhonchi.    ABDOMEN: ND. Soft,  NT, no guarding / rebound / rigidity. BS normoactive. No CVA tenderness.    BACK: No spine tenderness.  EXTREMITIES: no edema. no leg or calf TP.  SKIN: no rash. or skin break or ulcer on exposed skin. No cellulitis.  NEUROLOGIC: AAO*3. global weakness, mild. sensation intact, speech fluent.    PSYCHIATRIC: Calm.  No agitation.      LABS:                        8.6    5.64  )-----------( 190      ( 30 Jan 2023 06:07 )             26.9     01-30    141  |  105  |  12  ----------------------------<  148<H>  3.8   |  29  |  0.76    Ca    8.1<L>      30 Jan 2023 06:07    TPro  5.2<L>  /  Alb  2.2<L>  /  TBili  0.8  /  DBili  x   /  AST  37  /  ALT  13  /  AlkPhos  69  01-30              COVID-19 PCR: NotDetec (27 Jan 2023 19:06)      RADIOLOGY & ADDITIONAL TESTS:  Imaging from Last 24 Hours:    Electrocardiogram/QTc Interval:    COORDINATION OF CARE:  Care Discussed with Consultants/Other Providers:

## 2023-01-30 NOTE — DIETITIAN INITIAL EVALUATION ADULT - OTHER INFO
Reason for Admission: Lumbar Radiculopathy  History of Present Illness:   This is an 88 YO male with PMH of HTN, HLD,  Nonobstructive CAD, prostate cancer s/p RT,  DM II, Arthritis s/p Right total knee replacement in 2008, squamous cell carcinoma of scalp, BPH, ASHD, Chronic lower back pain secondary to Lumbar stenosis with neurogenic claudication, COVID 2020. Pt reports he has chronic lower back pain radiating down to B/L legs (Left > right) for several years and progressively worsened with walking and ADLs in the past 6 months. Pt describes the pain as "sharp" rates at 9 out of 10. Conservative therapies including NSAIDs, steroid injections, physical therapy, ice/heat and rest have provided minimal relief from pain.    MRI of lumbar spine revealed  Convex left curvature with straightening of the lordosis, multilevel loss of disc signal and height and anterior spurring and bulging from L2-L3 through L5-S1. No vertebral body compression fracture.    01/24/2023, he had right posterior spine  L3-L5 extreme lateral lumbar interbody fusion, L2-S1 posterior column osteotomy, facetectomy, foraminotomy, L3-L5 posterior instrumented fusion    paraspineous muscle flap wound closure. He was extubated on 01/25/2023 and transitioned  to 3L N/C. Drain removed 01/27. Patient was evaluated by PM&R and therapy for functional deficits, gait/ADL impairments and acute rehabilitation was recommended. Patient was medically optimized for discharge to [facility] IRU on 1/27/23.    At this time patient w/ adequate appetite/intake consuming % of meals. Family states protein intake is limited, recommend Glucerna 8oz PO Daily (Provides 220kcal-10grams of Protein) to assist patient in meeting assist patient in meeting estimated protein requirements. No issues w/ chewing and swallowing. Denies N/V/D, however constipation, last BM 1/29. Recommend increased fiber/fluids at meals to assist in regular BM. Per family w/ 30 lb weight loss x 1 year .6 lb.  Noted: A1C 7.6, POCT 154-184 mg/dL in last 24 hours. Provided education on Consistent Carbohydrate DASH/TLC nutrition therapy.

## 2023-01-30 NOTE — DIETITIAN INITIAL EVALUATION ADULT - NUTRITIONGOAL OUTCOME2
Patient to show improvement in glycemic control (POCT blood glucose) on Consistent Carbohydrate diet.

## 2023-01-30 NOTE — PROGRESS NOTE ADULT - ASSESSMENT
88 YO male with PMH of HTN, HLD,  Nonobstructive CAD, prostate cancer s/p RT,  DM II, Arthritis s/p Right total knee replacement in 2008, squamous cell carcinoma of scalp, BPH, ASHD, Chronic lower back pain secondary to Lumbar stenosis with neurogenic claudication, COVID 2020 who presented to [facility] on 1/24 with c/o  lower back pain radiating down to B/L legs that has  progressively worsened and has failed conservation treatments.    MRI of lumbar spine revealed  Convex left curvature with straightening of the lordosis, multilevel loss of disc signal and height and anterior spurring and bulging from L2-L3 through L5-S1. He is s/p right posterior spine  L3-L5 extreme lateral lumbar interbody fusion, L2-S1 posterior column osteotomy, facetectomy, foraminotomy, L3-L5 posterior instrumented fusion  paraspineous muscle flap wound closure on 1/24.  Patient now with gait Instability, ADL impairments and Functional impairments.    *Previously constipated- Last BM on 1/29, s/p Fleet enema * Pain management *     #Lumbar Radiculopathy  - MRI of lumbar spine revealed  Convex left curvature with straightening of the lordosis, multilevel loss of disc signal and height and anterior spurring and bulging from L2-L3 through L5-S1.   - now s/p   L3-L5 extreme lateral lumbar interbody fusion, L2-S1 posterior column osteotomy, facetectomy, foraminotomy, L3-L5 posterior instrumented fusion  paraspineous muscle flap wound closure on 1/24.  - Start Comprehensive Rehab Program: PT/OT 3hours daily and 5 days weekly  - PT: Focused on improving strength, endurance, coordination, balance, functional mobility, and transfers  - OT: Focused on improving strength, fine motor skills, coordination, posture and ADLs.      #HLD  - Lipitor 80mg daily    #HTN  - Lisinopril  10mg daily  - Metoprolol 25mg BID    #DM II  - ISS and FS  - Admelog and Lantus    #BPH  - Flomax 0.4mg daily    #Pain management  - Tylenol PRN, Oxycodone PRN, Zanaflex 2mg prn    #DVT ppx  -  Heparin, SCD, TEDs    #GI ppx  - Pepcid 20mg    #Bowel Regimen  - Senna, Miralax daily  - Previously constipated- now resolved s/p Fleet enema    #Bladder management  - BS on admission, and q 8 hours (SC if > 400)  - Monitor UO    #FEN   - Diet: Regular. Cardiac  - Supplements vit D, calcium    #Skin:  - Skin on admission: Thoracic spine incision + 2 drain site with surgiglue, Right flank incision with surgiglue BRANDI  - Pressure injury/Skin: Turn Q2hrs while in bed, OOB to Chair, PT/OT     #Sleep:   - Maintain quiet hours and low stim environment.  - Melatonin 3mgN to maximize participation in therapy during the day.     #Precaution  - Fall, Aspiration, cardiac, thoracic spine    ----------------------------------------------------------------  Outpatient Follow-up (Specialty/Name of physician):

## 2023-01-30 NOTE — DIETITIAN INITIAL EVALUATION ADULT - ADD RECOMMEND
1. Recommend Consistent Carbohydrate DASH/TLC diet.   2. Recommend Glucerna 8oz PO Daily (Provides 220kcal-10grams of Protein) to assist pt in meeting estimated protein energy needs.  3. Monitor PO intake, GI tolerance, skin integrity, labs, weight, and bowel movement regularity.   4. Honor food preferences as feasible.   5. Encouraged patient to focus on high-protein, high-calorie foods during meals to provide energy to participate in rehab activities   6. Provide ongoing diet education as needed  7. RD remains available upon request and will follow-up per protocol

## 2023-01-30 NOTE — DIETITIAN INITIAL EVALUATION ADULT - PERTINENT MEDS FT
MEDICATIONS  (STANDING):  atorvastatin 80 milliGRAM(s) Oral at bedtime  bisacodyl 5 milliGRAM(s) Oral every 12 hours  bisacodyl Suppository 10 milliGRAM(s) Rectal once  cholecalciferol 2000 Unit(s) Oral <User Schedule>  dextrose 5%. 1000 milliLiter(s) (50 mL/Hr) IV Continuous <Continuous>  dextrose 5%. 1000 milliLiter(s) (100 mL/Hr) IV Continuous <Continuous>  dextrose 50% Injectable 25 Gram(s) IV Push once  dextrose 50% Injectable 12.5 Gram(s) IV Push once  dextrose 50% Injectable 25 Gram(s) IV Push once  famotidine    Tablet 20 milliGRAM(s) Oral daily  glucagon  Injectable 1 milliGRAM(s) IntraMuscular once  heparin   Injectable 5000 Unit(s) SubCutaneous every 12 hours  insulin glargine Injectable (LANTUS) 9 Unit(s) SubCutaneous at bedtime  insulin lispro (ADMELOG) corrective regimen sliding scale   SubCutaneous three times a day before meals  insulin lispro (ADMELOG) corrective regimen sliding scale   SubCutaneous at bedtime  lactulose Syrup 10 Gram(s) Oral once  lisinopril 10 milliGRAM(s) Oral daily  metoprolol tartrate 25 milliGRAM(s) Oral two times a day  polyethylene glycol 3350 17 Gram(s) Oral daily  senna 2 Tablet(s) Oral at bedtime  tamsulosin 0.4 milliGRAM(s) Oral at bedtime    MEDICATIONS  (PRN):  acetaminophen     Tablet .. 650 milliGRAM(s) Oral every 6 hours PRN Temp greater or equal to 38C (100.4F), Mild Pain (1 - 3)  dextrose Oral Gel 15 Gram(s) Oral once PRN Blood Glucose LESS THAN 70 milliGRAM(s)/deciliter  melatonin 3 milliGRAM(s) Oral at bedtime PRN Insomnia  oxyCODONE    IR 5 milliGRAM(s) Oral every 6 hours PRN Moderate Pain (4 - 6)  tiZANidine 2 milliGRAM(s) Oral every 8 hours PRN Muscle Spasm

## 2023-01-31 LAB
GLUCOSE BLDC GLUCOMTR-MCNC: 104 MG/DL — HIGH (ref 70–99)
GLUCOSE BLDC GLUCOMTR-MCNC: 150 MG/DL — HIGH (ref 70–99)
GLUCOSE BLDC GLUCOMTR-MCNC: 175 MG/DL — HIGH (ref 70–99)
GLUCOSE BLDC GLUCOMTR-MCNC: 314 MG/DL — HIGH (ref 70–99)

## 2023-01-31 PROCEDURE — 99232 SBSQ HOSP IP/OBS MODERATE 35: CPT

## 2023-01-31 RX ORDER — LANOLIN ALCOHOL/MO/W.PET/CERES
3 CREAM (GRAM) TOPICAL AT BEDTIME
Refills: 0 | Status: DISCONTINUED | OUTPATIENT
Start: 2023-01-31 | End: 2023-02-02

## 2023-01-31 RX ORDER — METFORMIN HYDROCHLORIDE 850 MG/1
500 TABLET ORAL
Refills: 0 | Status: DISCONTINUED | OUTPATIENT
Start: 2023-01-31 | End: 2023-02-03

## 2023-01-31 RX ADMIN — HEPARIN SODIUM 5000 UNIT(S): 5000 INJECTION INTRAVENOUS; SUBCUTANEOUS at 17:42

## 2023-01-31 RX ADMIN — ATORVASTATIN CALCIUM 80 MILLIGRAM(S): 80 TABLET, FILM COATED ORAL at 22:29

## 2023-01-31 RX ADMIN — OXYCODONE HYDROCHLORIDE 5 MILLIGRAM(S): 5 TABLET ORAL at 14:44

## 2023-01-31 RX ADMIN — METFORMIN HYDROCHLORIDE 500 MILLIGRAM(S): 850 TABLET ORAL at 17:42

## 2023-01-31 RX ADMIN — Medication 5 MILLIGRAM(S): at 17:42

## 2023-01-31 RX ADMIN — Medication 25 MILLIGRAM(S): at 17:43

## 2023-01-31 RX ADMIN — Medication 25 MILLIGRAM(S): at 05:50

## 2023-01-31 RX ADMIN — HEPARIN SODIUM 5000 UNIT(S): 5000 INJECTION INTRAVENOUS; SUBCUTANEOUS at 05:51

## 2023-01-31 RX ADMIN — LISINOPRIL 10 MILLIGRAM(S): 2.5 TABLET ORAL at 05:50

## 2023-01-31 RX ADMIN — SENNA PLUS 2 TABLET(S): 8.6 TABLET ORAL at 22:29

## 2023-01-31 RX ADMIN — OXYCODONE HYDROCHLORIDE 5 MILLIGRAM(S): 5 TABLET ORAL at 09:12

## 2023-01-31 RX ADMIN — INSULIN GLARGINE 9 UNIT(S): 100 INJECTION, SOLUTION SUBCUTANEOUS at 22:28

## 2023-01-31 RX ADMIN — OXYCODONE HYDROCHLORIDE 5 MILLIGRAM(S): 5 TABLET ORAL at 08:12

## 2023-01-31 RX ADMIN — Medication 5 MILLIGRAM(S): at 05:50

## 2023-01-31 RX ADMIN — Medication 8: at 11:39

## 2023-01-31 RX ADMIN — POLYETHYLENE GLYCOL 3350 17 GRAM(S): 17 POWDER, FOR SOLUTION ORAL at 11:39

## 2023-01-31 RX ADMIN — TAMSULOSIN HYDROCHLORIDE 0.4 MILLIGRAM(S): 0.4 CAPSULE ORAL at 22:29

## 2023-01-31 RX ADMIN — FAMOTIDINE 20 MILLIGRAM(S): 10 INJECTION INTRAVENOUS at 11:39

## 2023-01-31 RX ADMIN — OXYCODONE HYDROCHLORIDE 5 MILLIGRAM(S): 5 TABLET ORAL at 15:44

## 2023-01-31 RX ADMIN — Medication 3 MILLIGRAM(S): at 22:32

## 2023-01-31 NOTE — PROGRESS NOTE ADULT - SUBJECTIVE AND OBJECTIVE BOX
Medicine Progress Note    Patient is a 87y old  Male who presents with a chief complaint of Lumbar Radiculopathy (31 Jan 2023 10:50)      SUBJECTIVE / OVERNIGHT EVENTS:  seen and examined  Chart reviewed  No overnight events  Limb weakness improving with therapy  BM+  back pain controlled with meds  No complaints    ADDITIONAL REVIEW OF SYSTEMS:  no fever/chills/CP/sob/palpitation/dizziness/ abd pain/nausea/vomiting/diarrhea/constipation/headaches. all other ROS neg    MEDICATIONS  (STANDING):  atorvastatin 80 milliGRAM(s) Oral at bedtime  bisacodyl 5 milliGRAM(s) Oral every 12 hours  bisacodyl Suppository 10 milliGRAM(s) Rectal once  cholecalciferol 2000 Unit(s) Oral <User Schedule>  dextrose 5%. 1000 milliLiter(s) (50 mL/Hr) IV Continuous <Continuous>  dextrose 5%. 1000 milliLiter(s) (100 mL/Hr) IV Continuous <Continuous>  dextrose 50% Injectable 25 Gram(s) IV Push once  dextrose 50% Injectable 12.5 Gram(s) IV Push once  dextrose 50% Injectable 25 Gram(s) IV Push once  famotidine    Tablet 20 milliGRAM(s) Oral daily  glucagon  Injectable 1 milliGRAM(s) IntraMuscular once  heparin   Injectable 5000 Unit(s) SubCutaneous every 12 hours  insulin glargine Injectable (LANTUS) 9 Unit(s) SubCutaneous at bedtime  insulin lispro (ADMELOG) corrective regimen sliding scale   SubCutaneous three times a day before meals  insulin lispro (ADMELOG) corrective regimen sliding scale   SubCutaneous at bedtime  lactulose Syrup 10 Gram(s) Oral once  lisinopril 10 milliGRAM(s) Oral daily  melatonin 3 milliGRAM(s) Oral at bedtime  metoprolol tartrate 25 milliGRAM(s) Oral two times a day  polyethylene glycol 3350 17 Gram(s) Oral daily  senna 2 Tablet(s) Oral at bedtime  tamsulosin 0.4 milliGRAM(s) Oral at bedtime    MEDICATIONS  (PRN):  acetaminophen     Tablet .. 650 milliGRAM(s) Oral every 6 hours PRN Temp greater or equal to 38C (100.4F), Mild Pain (1 - 3)  dextrose Oral Gel 15 Gram(s) Oral once PRN Blood Glucose LESS THAN 70 milliGRAM(s)/deciliter  oxyCODONE    IR 5 milliGRAM(s) Oral every 6 hours PRN Moderate Pain (4 - 6)  tiZANidine 2 milliGRAM(s) Oral every 8 hours PRN Muscle Spasm    CAPILLARY BLOOD GLUCOSE      POCT Blood Glucose.: 314 mg/dL (31 Jan 2023 11:38)  POCT Blood Glucose.: 150 mg/dL (31 Jan 2023 07:52)  POCT Blood Glucose.: 172 mg/dL (30 Jan 2023 21:23)  POCT Blood Glucose.: 168 mg/dL (30 Jan 2023 16:26)    I&O's Summary      PHYSICAL EXAM:  Vital Signs Last 24 Hrs  T(C): 36.5 (31 Jan 2023 08:17), Max: 36.7 (30 Jan 2023 21:40)  T(F): 97.7 (31 Jan 2023 08:17), Max: 98.1 (31 Jan 2023 05:37)  HR: 86 (31 Jan 2023 08:17) (86 - 99)  BP: 139/78 (31 Jan 2023 08:17) (126/75 - 139/78)  BP(mean): --  RR: 16 (31 Jan 2023 08:17) (16 - 18)  SpO2: 96% (31 Jan 2023 08:17) (94% - 97%)    Parameters below as of 31 Jan 2023 08:17  Patient On (Oxygen Delivery Method): room air      GENERAL: Not in distress. Alert    HEENT: AT/NC. clear conjuctiva, MMM.   no pallor or icterus  CARDIOVASCULAR: RRR S1, S2. No murmur/rubs/gallop  LUNGS: BLAE+, no rales, no wheezing, no rhonchi.    ABDOMEN: ND. Soft,  NT, no guarding / rebound / rigidity. BS normoactive. No CVA tenderness.    BACK: No spine tenderness.  EXTREMITIES: no edema. no leg or calf TP.  SKIN: no rash. or skin break or ulcer on exposed skin. No cellulitis.  NEUROLOGIC: AAO*3. global weakness, mild. sensation intact, speech fluent.    PSYCHIATRIC: Calm.  No agitation.    LABS:                        8.6    5.64  )-----------( 190      ( 30 Jan 2023 06:07 )             26.9     01-30    141  |  105  |  12  ----------------------------<  148<H>  3.8   |  29  |  0.76    Ca    8.1<L>      30 Jan 2023 06:07    TPro  5.2<L>  /  Alb  2.2<L>  /  TBili  0.8  /  DBili  x   /  AST  37  /  ALT  13  /  AlkPhos  69  01-30              COVID-19 PCR: NotDetec (27 Jan 2023 19:06)      RADIOLOGY & ADDITIONAL TESTS:  Imaging from Last 24 Hours:    Electrocardiogram/QTc Interval:    COORDINATION OF CARE:  Care Discussed with Consultants/Other Providers:

## 2023-01-31 NOTE — PROGRESS NOTE ADULT - SUBJECTIVE AND OBJECTIVE BOX
CC: Lumbar Radiculopathy    Today's Subjective & Objective Findings:  Patient seen and examined at bedside.  States that he slept on and off last night.  Discussed Melatonin at HS and willing to try  Last BM on 1/30.  Overall, he feels like his back pain is improving.   No other complaints.    Denies headache, dizziness, visual changes, chest pain, SOB/LUNA, abdominal pain, nausea, vomiting, diarrhea, dysuria, numbness or tingling.    Therapy-- engaging, motivated.  Pain is a limiting factor.  Feels like therapies are going well.     Vital Signs Last 24 Hrs  T(C): 36.5 (31 Jan 2023 08:17), Max: 36.7 (30 Jan 2023 21:40)  T(F): 97.7 (31 Jan 2023 08:17), Max: 98.1 (31 Jan 2023 05:37)  HR: 86 (31 Jan 2023 08:17) (86 - 99)  BP: 139/78 (31 Jan 2023 08:17) (126/75 - 139/78)  BP(mean): --  RR: 16 (31 Jan 2023 08:17) (16 - 18)  SpO2: 96% (31 Jan 2023 08:17) (94% - 97%)    PHYSICAL EXAM:  Gen - NAD, Comfortable  HEENT - NCAT, EOMI, MMM  Neck - Supple, No limited ROM  Pulm - CTAB, No wheeze, No rhonchi, No crackles  Cardiovascular - RRR, S1S2  Chest - good chest expansion, good respiratory effort  Abdomen - Soft, NT/ND, +BS  Extremities - No Cyanosis, no clubbing, no edema, no calf tenderness  Neuro-     Cognitive - awake, alert, oriented to person, place, date, year, and situation.  Able to follow command     Communication - Fluent     Attention: Intact,      Judgement: Good evidence of judgement     Cranial Nerves - CN 2-12 intact.     Motor -                     LEFT    UE - 4+/5                    RIGHT UE - 4+/5                    LEFT    LE - 3-/5                    RIGHT LE - 3/5        Sensory - Intact to LT      Reflexes - DTR Intact, No primitive reflexive  Skin:  thoracic spine incision + 2 drain site with surgiglue, Right flank incision with surgiglue BRANDI      LAB                        8.6    5.64  )-----------( 190      ( 30 Jan 2023 06:07 )             26.9     01-30    141  |  105  |  12  ----------------------------<  148<H>  3.8   |  29  |  0.76    Ca    8.1<L>      30 Jan 2023 06:07    TPro  5.2<L>  /  Alb  2.2<L>  /  TBili  0.8  /  DBili  x   /  AST  37  /  ALT  13  /  AlkPhos  69  01-30    LIVER FUNCTIONS - ( 30 Jan 2023 06:07 )  Alb: 2.2 g/dL / Pro: 5.2 g/dL / ALK PHOS: 69 U/L / ALT: 13 U/L / AST: 37 U/L / GGT: x             MEDICATIONS  (STANDING):  atorvastatin 80 milliGRAM(s) Oral at bedtime  bisacodyl 5 milliGRAM(s) Oral every 12 hours  bisacodyl Suppository 10 milliGRAM(s) Rectal once  cholecalciferol 2000 Unit(s) Oral <User Schedule>  dextrose 5%. 1000 milliLiter(s) (50 mL/Hr) IV Continuous <Continuous>  dextrose 5%. 1000 milliLiter(s) (100 mL/Hr) IV Continuous <Continuous>  dextrose 50% Injectable 25 Gram(s) IV Push once  dextrose 50% Injectable 12.5 Gram(s) IV Push once  dextrose 50% Injectable 25 Gram(s) IV Push once  famotidine    Tablet 20 milliGRAM(s) Oral daily  glucagon  Injectable 1 milliGRAM(s) IntraMuscular once  heparin   Injectable 5000 Unit(s) SubCutaneous every 12 hours  insulin glargine Injectable (LANTUS) 9 Unit(s) SubCutaneous at bedtime  insulin lispro (ADMELOG) corrective regimen sliding scale   SubCutaneous three times a day before meals  insulin lispro (ADMELOG) corrective regimen sliding scale   SubCutaneous at bedtime  lactulose Syrup 10 Gram(s) Oral once  lisinopril 10 milliGRAM(s) Oral daily  metoprolol tartrate 25 milliGRAM(s) Oral two times a day  polyethylene glycol 3350 17 Gram(s) Oral daily  senna 2 Tablet(s) Oral at bedtime  tamsulosin 0.4 milliGRAM(s) Oral at bedtime    MEDICATIONS  (PRN):  acetaminophen     Tablet .. 650 milliGRAM(s) Oral every 6 hours PRN Temp greater or equal to 38C (100.4F), Mild Pain (1 - 3)  dextrose Oral Gel 15 Gram(s) Oral once PRN Blood Glucose LESS THAN 70 milliGRAM(s)/deciliter  melatonin 3 milliGRAM(s) Oral at bedtime PRN Insomnia  oxyCODONE    IR 5 milliGRAM(s) Oral every 6 hours PRN Moderate Pain (4 - 6)  tiZANidine 2 milliGRAM(s) Oral every 8 hours PRN Muscle Spasm

## 2023-01-31 NOTE — PROGRESS NOTE ADULT - ASSESSMENT
This is an 86 y/o M with PMH of HTN, HLD, Nonobstructive CAD, prostate cancer s/p RT, T2DM, Arthritis s/p Right total knee replacement in 2008, squamous cell carcinoma of scalp, BPH, ASHD, Chronic lower back pain secondary to Lumbar stenosis with neurogenic claudication, COVID 2020 who presented to [facility] on 1/24 with c/o lower back pain radiating down to B/L legs that has progressively worsened and has failed conservation treatments. MRI of lumbar spine revealed  Convex left curvature with straightening of the lordosis, multilevel loss of disc signal and height and anterior spurring and bulging from L2-L3 through L5-S1. He is s/p right posterior spine L3-L5 extreme lateral lumbar interbody fusion, L2-S1 posterior column osteotomy, facetectomy, foraminotomy, L3-L5 posterior instrumented fusion paraspinous muscle flap wound closure on 1/24. Patient now with gait Instability, ADL impairments and Functional impairments.    #Lumbar Radiculopathy  -MRI of lumbar spine revealed Convex left curvature with straightening of the lordosis, multilevel loss of disc signal and height and anterior spurring and bulging from L2-L3 through L5-S1  -Now s/p L3-L5 extreme lateral lumbar interbody fusion, L2-S1 posterior column osteotomy, facetectomy, foraminotomy, L3-L5 posterior instrumented fusion paraspinous muscle flap wound closure on 1/24  - tolerating comprehensive rehab program   -Pain management, bowel regimen per rehab     # anemia  - no active s/s bleeding, likely post op anemia,   - continue to monitor    #HLD  -Lipitor 80mg qd    #HTN  -Lisinopril 10mg qd  -Metoprolol 25mg BID  - check orthostasis periodically and adjust dose    #DM II  - A1c 8.5  - hyperglycemia  -ISS and FS  -Lantus 9un qhs  - added metformin 500 mg BID. increase to dose 1000 mg BID in 1 week and DC lantus if BS is controlled  - monitor FSBS and adjust dose    #BPH  -Flomax 0.4mg qd    #DVT ppx - HSQ  #GI ppx - Pepcid    d/w rehab team

## 2023-01-31 NOTE — PROGRESS NOTE ADULT - ASSESSMENT
88 YO male with PMH of HTN, HLD,  Nonobstructive CAD, prostate cancer s/p RT,  DM II, Arthritis s/p Right total knee replacement in 2008, squamous cell carcinoma of scalp, BPH, ASHD, Chronic lower back pain secondary to Lumbar stenosis with neurogenic claudication, COVID 2020 who presented to [facility] on 1/24 with c/o  lower back pain radiating down to B/L legs that has  progressively worsened and has failed conservation treatments.    MRI of lumbar spine revealed  Convex left curvature with straightening of the lordosis, multilevel loss of disc signal and height and anterior spurring and bulging from L2-L3 through L5-S1. He is s/p right posterior spine  L3-L5 extreme lateral lumbar interbody fusion, L2-S1 posterior column osteotomy, facetectomy, foraminotomy, L3-L5 posterior instrumented fusion  paraspineous muscle flap wound closure on 1/24.  Patient now with gait Instability, ADL impairments and Functional impairments.    * Pain management- currently controlled * Melatonin @ HS *      #Lumbar Radiculopathy  - MRI of lumbar spine revealed  Convex left curvature with straightening of the lordosis, multilevel loss of disc signal and height and anterior spurring and bulging from L2-L3 through L5-S1.   - now s/p   L3-L5 extreme lateral lumbar interbody fusion, L2-S1 posterior column osteotomy, facetectomy, foraminotomy, L3-L5 posterior instrumented fusion  paraspineous muscle flap wound closure on 1/24.  - Start Comprehensive Rehab Program: PT/OT 3hours daily and 5 days weekly  - PT: Focused on improving strength, endurance, coordination, balance, functional mobility, and transfers  - OT: Focused on improving strength, fine motor skills, coordination, posture and ADLs.      #HLD  - Lipitor 80mg daily    #HTN  - Lisinopril  10mg daily  - Metoprolol 25mg BID    #DM II  - ISS and FS  - Admelog and Lantus    #BPH  - Flomax 0.4mg daily    #Pain management  - Tylenol PRN, Oxycodone PRN, Zanaflex 2mg prn    #DVT ppx  -  Heparin, SCD, TEDs    #GI ppx  - Pepcid 20mg    #Bowel Regimen  - Senna, Miralax daily  - Previously constipated- now resolved s/p Fleet enema    #Bladder management  - BS on admission, and q 8 hours (SC if > 400)  - Monitor UO    #FEN   - Diet: Regular. Cardiac  - Supplements vit D, calcium    #Skin:  - Skin on admission: Thoracic spine incision + 2 drain site with surgiglue, Right flank incision with surgiglue BRANDI  - Pressure injury/Skin: Turn Q2hrs while in bed, OOB to Chair, PT/OT     #Sleep:   - Maintain quiet hours and low stim environment.  - Melatonin 3mgN to maximize participation in therapy during the day.     #Precaution  - Fall, Aspiration, cardiac, thoracic spine    ----------------------------------------------------------------  Outpatient Follow-up (Specialty/Name of physician):  PCP: Dr. Rapp

## 2023-02-01 LAB
GLUCOSE BLDC GLUCOMTR-MCNC: 136 MG/DL — HIGH (ref 70–99)
GLUCOSE BLDC GLUCOMTR-MCNC: 145 MG/DL — HIGH (ref 70–99)
GLUCOSE BLDC GLUCOMTR-MCNC: 179 MG/DL — HIGH (ref 70–99)
GLUCOSE BLDC GLUCOMTR-MCNC: 208 MG/DL — HIGH (ref 70–99)

## 2023-02-01 PROCEDURE — 99232 SBSQ HOSP IP/OBS MODERATE 35: CPT

## 2023-02-01 RX ORDER — POLYETHYLENE GLYCOL 3350 17 G/17G
17 POWDER, FOR SOLUTION ORAL
Refills: 0 | Status: DISCONTINUED | OUTPATIENT
Start: 2023-02-01 | End: 2023-02-06

## 2023-02-01 RX ADMIN — OXYCODONE HYDROCHLORIDE 5 MILLIGRAM(S): 5 TABLET ORAL at 08:42

## 2023-02-01 RX ADMIN — Medication 5 MILLIGRAM(S): at 05:06

## 2023-02-01 RX ADMIN — INSULIN GLARGINE 9 UNIT(S): 100 INJECTION, SOLUTION SUBCUTANEOUS at 21:01

## 2023-02-01 RX ADMIN — OXYCODONE HYDROCHLORIDE 5 MILLIGRAM(S): 5 TABLET ORAL at 09:42

## 2023-02-01 RX ADMIN — HEPARIN SODIUM 5000 UNIT(S): 5000 INJECTION INTRAVENOUS; SUBCUTANEOUS at 18:04

## 2023-02-01 RX ADMIN — Medication 25 MILLIGRAM(S): at 05:06

## 2023-02-01 RX ADMIN — Medication 2000 UNIT(S): at 08:43

## 2023-02-01 RX ADMIN — ATORVASTATIN CALCIUM 80 MILLIGRAM(S): 80 TABLET, FILM COATED ORAL at 21:01

## 2023-02-01 RX ADMIN — FAMOTIDINE 20 MILLIGRAM(S): 10 INJECTION INTRAVENOUS at 11:35

## 2023-02-01 RX ADMIN — Medication 5 MILLIGRAM(S): at 18:04

## 2023-02-01 RX ADMIN — Medication 25 MILLIGRAM(S): at 18:04

## 2023-02-01 RX ADMIN — METFORMIN HYDROCHLORIDE 500 MILLIGRAM(S): 850 TABLET ORAL at 07:46

## 2023-02-01 RX ADMIN — Medication 3 MILLIGRAM(S): at 21:01

## 2023-02-01 RX ADMIN — Medication 650 MILLIGRAM(S): at 23:36

## 2023-02-01 RX ADMIN — Medication 2: at 11:36

## 2023-02-01 RX ADMIN — SENNA PLUS 2 TABLET(S): 8.6 TABLET ORAL at 21:01

## 2023-02-01 RX ADMIN — POLYETHYLENE GLYCOL 3350 17 GRAM(S): 17 POWDER, FOR SOLUTION ORAL at 18:04

## 2023-02-01 RX ADMIN — LISINOPRIL 10 MILLIGRAM(S): 2.5 TABLET ORAL at 05:06

## 2023-02-01 RX ADMIN — HEPARIN SODIUM 5000 UNIT(S): 5000 INJECTION INTRAVENOUS; SUBCUTANEOUS at 05:05

## 2023-02-01 RX ADMIN — TAMSULOSIN HYDROCHLORIDE 0.4 MILLIGRAM(S): 0.4 CAPSULE ORAL at 21:01

## 2023-02-01 RX ADMIN — METFORMIN HYDROCHLORIDE 500 MILLIGRAM(S): 850 TABLET ORAL at 18:04

## 2023-02-01 RX ADMIN — Medication 650 MILLIGRAM(S): at 23:06

## 2023-02-01 RX ADMIN — Medication 1 ENEMA: at 22:30

## 2023-02-01 NOTE — PROGRESS NOTE ADULT - ASSESSMENT
This is an 86 y/o M with PMH of HTN, HLD, Nonobstructive CAD, prostate cancer s/p RT, T2DM, Arthritis s/p Right total knee replacement in 2008, squamous cell carcinoma of scalp, BPH, ASHD, Chronic lower back pain secondary to Lumbar stenosis with neurogenic claudication, COVID 2020 who presented to [facility] on 1/24 with c/o lower back pain radiating down to B/L legs that has progressively worsened and has failed conservation treatments. MRI of lumbar spine revealed  Convex left curvature with straightening of the lordosis, multilevel loss of disc signal and height and anterior spurring and bulging from L2-L3 through L5-S1. He is s/p right posterior spine L3-L5 extreme lateral lumbar interbody fusion, L2-S1 posterior column osteotomy, facetectomy, foraminotomy, L3-L5 posterior instrumented fusion paraspinous muscle flap wound closure on 1/24. Patient now with gait Instability, ADL impairments and Functional impairments.    #Lumbar Radiculopathy  -MRI of lumbar spine revealed Convex left curvature with straightening of the lordosis, multilevel loss of disc signal and height and anterior spurring and bulging from L2-L3 through L5-S1  -Now s/p L3-L5 extreme lateral lumbar interbody fusion, L2-S1 posterior column osteotomy, facetectomy, foraminotomy, L3-L5 posterior instrumented fusion paraspinous muscle flap wound closure on 1/24  - tolerating comprehensive rehab program   -Pain management, bowel regimen per rehab     # Anemia  - no active s/s bleeding, likely post op anemia,   - continue to monitor    #HLD  -Lipitor 80mg qd    #HTN  -Lisinopril 10mg qd  -Metoprolol 25mg BID  - check orthostasis periodically and adjust dose    #DM II  - A1c 8.5  - hyperglycemia  -ISS and FS  -Lantus 9un qhs  - added metformin 500 mg BID. increase to dose 1000 mg BID in 1 week and DC lantus if BS is controlled  - monitor FSBS and adjust dose    #BPH  -Flomax 0.4mg qd    #DVT ppx - HSQ  #GI ppx - Pepcid    d/w rehab team

## 2023-02-01 NOTE — PROGRESS NOTE ADULT - ASSESSMENT
88 YO male with PMH of HTN, HLD,  Nonobstructive CAD, prostate cancer s/p RT,  DM II, Arthritis s/p Right total knee replacement in 2008, squamous cell carcinoma of scalp, BPH, ASHD, Chronic lower back pain secondary to Lumbar stenosis with neurogenic claudication, COVID 2020 who presented to [facility] on 1/24 with c/o  lower back pain radiating down to B/L legs that has  progressively worsened and has failed conservation treatments.    MRI of lumbar spine revealed  Convex left curvature with straightening of the lordosis, multilevel loss of disc signal and height and anterior spurring and bulging from L2-L3 through L5-S1. He is s/p right posterior spine  L3-L5 extreme lateral lumbar interbody fusion, L2-S1 posterior column osteotomy, facetectomy, foraminotomy, L3-L5 posterior instrumented fusion  paraspineous muscle flap wound closure on 1/24.  Patient now with gait Instability, ADL impairments and Functional impairments.    * Pain management- currently controlled * Continue Melatonin @ HS * Surgical site with Dermabond *       #Lumbar Radiculopathy  - MRI of lumbar spine revealed  Convex left curvature with straightening of the lordosis, multilevel loss of disc signal and height and anterior spurring and bulging from L2-L3 through L5-S1.   - now s/p   L3-L5 extreme lateral lumbar interbody fusion, L2-S1 posterior column osteotomy, facetectomy, foraminotomy, L3-L5 posterior instrumented fusion  paraspineous muscle flap wound closure on 1/24.  - Surgical site with dermabond- well approximated  - Start Comprehensive Rehab Program: PT/OT 3hours daily and 5 days weekly  - PT: Focused on improving strength, endurance, coordination, balance, functional mobility, and transfers  - OT: Focused on improving strength, fine motor skills, coordination, posture and ADLs.      #HLD  - Lipitor 80mg daily    #HTN  - Lisinopril  10mg daily  - Metoprolol 25mg BID    #DM II  - ISS and FS  - Admelog and Lantus    #BPH  - Flomax 0.4mg daily    #Pain management  - Tylenol PRN, Oxycodone PRN, Zanaflex 2mg prn    #DVT ppx  -  Heparin, SCD, TEDs    #GI ppx  - Pepcid 20mg    #Bowel Regimen  - Senna, Miralax daily  - Previously constipated- now resolved s/p Fleet enema    #Bladder management  - BS on admission, and q 8 hours (SC if > 400)  - Monitor UO    #FEN   - Diet: Regular. Cardiac  - Supplements vit D, calcium    #Skin:  - Skin on admission: Thoracic spine incision + 2 drain site with surgiglue, Right flank incision with surgiglue BRANDI  - Pressure injury/Skin: Turn Q2hrs while in bed, OOB to Chair, PT/OT     #Sleep:   - Maintain quiet hours and low stim environment.  - Melatonin 3mgN to maximize participation in therapy during the day.     #Precaution  - Fall, Aspiration, cardiac, thoracic spine  ----------------------------------------------------------------  Outpatient Follow-up (Specialty/Name of physician):  PCP: Dr. Tamela Mckeon MD  Plastic Surgery  [address] [place] [address]  [address]  [place]. [id number] [phone]    Leo Smith MD  Neurosurgery  [address] [place] [address]  [address]  [place]. [address]  [phone]  ----------------------------------------------------------------   88 YO male with PMH of HTN, HLD,  Nonobstructive CAD, prostate cancer s/p RT,  DM II, Arthritis s/p Right total knee replacement in 2008, squamous cell carcinoma of scalp, BPH, ASHD, Chronic lower back pain secondary to Lumbar stenosis with neurogenic claudication, COVID 2020 who presented to [facility] on 1/24 with c/o  lower back pain radiating down to B/L legs that has  progressively worsened and has failed conservation treatments.    MRI of lumbar spine revealed  Convex left curvature with straightening of the lordosis, multilevel loss of disc signal and height and anterior spurring and bulging from L2-L3 through L5-S1. He is s/p right posterior spine  L3-L5 extreme lateral lumbar interbody fusion, L2-S1 posterior column osteotomy, facetectomy, foraminotomy, L3-L5 posterior instrumented fusion  paraspineous muscle flap wound closure on 1/24.  Patient now with gait Instability, ADL impairments and Functional impairments.    * Pain management- currently controlled * Continue Melatonin @ HS * Surgical site with Dermabond *   Bowel regimen adjusted *     #Lumbar Radiculopathy  - MRI of lumbar spine revealed  Convex left curvature with straightening of the lordosis, multilevel loss of disc signal and height and anterior spurring and bulging from L2-L3 through L5-S1.   - now s/p   L3-L5 extreme lateral lumbar interbody fusion, L2-S1 posterior column osteotomy, facetectomy, foraminotomy, L3-L5 posterior instrumented fusion  paraspineous muscle flap wound closure on 1/24.  - Surgical site with dermabond- well approximated  - Start Comprehensive Rehab Program: PT/OT 3hours daily and 5 days weekly  - PT: Focused on improving strength, endurance, coordination, balance, functional mobility, and transfers  - OT: Focused on improving strength, fine motor skills, coordination, posture and ADLs.      #HLD  - Lipitor 80mg daily    #HTN  - Lisinopril  10mg daily  - Metoprolol 25mg BID    #DM II  - ISS and FS  - Admelog and Lantus    #BPH  - Flomax 0.4mg daily    #Pain management  - Tylenol PRN, Oxycodone PRN, Zanaflex 2mg prn    #DVT ppx  -  Heparin, SCD, TEDs    #GI ppx  - Pepcid 20mg    #Bowel Regimen  - Senna, Miralax BID 2/1  - Fleet enema x1 dose 2/1   - Previously constipated- now resolved s/p Fleet enema    #Bladder management  - BS on admission, and q 8 hours (SC if > 400)  - Monitor UO    #FEN   - Diet: Regular. Cardiac  - Supplements vit D, calcium    #Skin:  - Skin on admission: Thoracic spine incision + 2 drain site with surgiglue, Right flank incision with surgiglue BRANDI  - Pressure injury/Skin: Turn Q2hrs while in bed, OOB to Chair, PT/OT     #Sleep:   - Maintain quiet hours and low stim environment.  - Melatonin 3mgN to maximize participation in therapy during the day.     #Precaution  - Fall, Aspiration, cardiac, thoracic spine  ----------------------------------------------------------------  Outpatient Follow-up (Specialty/Name of physician):  PCP: Dr. Tamela Mckeon MD  Plastic Surgery  [address] [place] [address]. [id number] [phone]    Leo Smith MD  Neurosurgery  [address] [place] [address]  [address]  [place]. [address]  [phone]  ----------------------------------------------------------------

## 2023-02-01 NOTE — PROGRESS NOTE ADULT - SUBJECTIVE AND OBJECTIVE BOX
CC: Lumbar Radiculopathy    Today's Subjective & Objective Findings:  Patient seen and examined at bedside.  States that he slept well last night.   Last BM on 1/30, per RN.  Overall, he feels like his back pain is improving.   No other complaints.    Denies headache, dizziness, visual changes, chest pain, SOB/LUNA, abdominal pain, nausea, vomiting, diarrhea, dysuria, numbness or tingling.    Therapy-- engaging, motivated.  Pain is a limiting factor.  Feels like therapies are going well.     Vital Signs Last 24 Hrs  T(C): 36.5 (31 Jan 2023 08:17), Max: 36.7 (30 Jan 2023 21:40)  T(F): 97.7 (31 Jan 2023 08:17), Max: 98.1 (31 Jan 2023 05:37)  HR: 86 (31 Jan 2023 08:17) (86 - 99)  BP: 139/78 (31 Jan 2023 08:17) (126/75 - 139/78)  BP(mean): --  RR: 16 (31 Jan 2023 08:17) (16 - 18)  SpO2: 96% (31 Jan 2023 08:17) (94% - 97%)    PHYSICAL EXAM:  Gen - NAD, Comfortable  HEENT - NCAT, EOMI, MMM  Neck - Supple, No limited ROM  Pulm - CTAB, No wheeze, No rhonchi, No crackles  Cardiovascular - RRR, S1S2  Chest - good chest expansion, good respiratory effort  Abdomen - Soft, NT/ND, +BS  Extremities - No Cyanosis, no clubbing, no edema, no calf tenderness  Neuro-     Cognitive - awake, alert, oriented to person, place, date, year, and situation.  Able to follow command     Communication - Fluent     Attention: Intact,      Judgement: Good evidence of judgement     Cranial Nerves - CN 2-12 intact.     Motor -                     LEFT    UE - 4+/5                    RIGHT UE - 4+/5                    LEFT    LE - 3-/5                    RIGHT LE - 3/5        Sensory - Intact to LT      Reflexes - DTR Intact, No primitive reflexive  Skin:  thoracic spine incision + 2 drain site with surgiglue, Right flank incision with surgiglue BRANDI      LAB                        8.6    5.64  )-----------( 190      ( 30 Jan 2023 06:07 )             26.9     01-30    141  |  105  |  12  ----------------------------<  148<H>  3.8   |  29  |  0.76    Ca    8.1<L>      30 Jan 2023 06:07    TPro  5.2<L>  /  Alb  2.2<L>  /  TBili  0.8  /  DBili  x   /  AST  37  /  ALT  13  /  AlkPhos  69  01-30    LIVER FUNCTIONS - ( 30 Jan 2023 06:07 )  Alb: 2.2 g/dL / Pro: 5.2 g/dL / ALK PHOS: 69 U/L / ALT: 13 U/L / AST: 37 U/L / GGT: x             MEDICATIONS  (STANDING):  atorvastatin 80 milliGRAM(s) Oral at bedtime  bisacodyl 5 milliGRAM(s) Oral every 12 hours  bisacodyl Suppository 10 milliGRAM(s) Rectal once  cholecalciferol 2000 Unit(s) Oral <User Schedule>  dextrose 5%. 1000 milliLiter(s) (50 mL/Hr) IV Continuous <Continuous>  dextrose 5%. 1000 milliLiter(s) (100 mL/Hr) IV Continuous <Continuous>  dextrose 50% Injectable 25 Gram(s) IV Push once  dextrose 50% Injectable 12.5 Gram(s) IV Push once  dextrose 50% Injectable 25 Gram(s) IV Push once  famotidine    Tablet 20 milliGRAM(s) Oral daily  glucagon  Injectable 1 milliGRAM(s) IntraMuscular once  heparin   Injectable 5000 Unit(s) SubCutaneous every 12 hours  insulin glargine Injectable (LANTUS) 9 Unit(s) SubCutaneous at bedtime  insulin lispro (ADMELOG) corrective regimen sliding scale   SubCutaneous three times a day before meals  insulin lispro (ADMELOG) corrective regimen sliding scale   SubCutaneous at bedtime  lactulose Syrup 10 Gram(s) Oral once  lisinopril 10 milliGRAM(s) Oral daily  metoprolol tartrate 25 milliGRAM(s) Oral two times a day  polyethylene glycol 3350 17 Gram(s) Oral daily  senna 2 Tablet(s) Oral at bedtime  tamsulosin 0.4 milliGRAM(s) Oral at bedtime    MEDICATIONS  (PRN):  acetaminophen     Tablet .. 650 milliGRAM(s) Oral every 6 hours PRN Temp greater or equal to 38C (100.4F), Mild Pain (1 - 3)  dextrose Oral Gel 15 Gram(s) Oral once PRN Blood Glucose LESS THAN 70 milliGRAM(s)/deciliter  melatonin 3 milliGRAM(s) Oral at bedtime PRN Insomnia  oxyCODONE    IR 5 milliGRAM(s) Oral every 6 hours PRN Moderate Pain (4 - 6)  tiZANidine 2 milliGRAM(s) Oral every 8 hours PRN Muscle Spasm

## 2023-02-02 LAB
ALBUMIN SERPL ELPH-MCNC: 2.8 G/DL — LOW (ref 3.3–5)
ALP SERPL-CCNC: 144 U/L — HIGH (ref 40–120)
ALT FLD-CCNC: 79 U/L — HIGH (ref 10–45)
ANION GAP SERPL CALC-SCNC: 9 MMOL/L — SIGNIFICANT CHANGE UP (ref 5–17)
AST SERPL-CCNC: 70 U/L — HIGH (ref 10–40)
BILIRUB SERPL-MCNC: 0.8 MG/DL — SIGNIFICANT CHANGE UP (ref 0.2–1.2)
BUN SERPL-MCNC: 11 MG/DL — SIGNIFICANT CHANGE UP (ref 7–23)
CALCIUM SERPL-MCNC: 8.7 MG/DL — SIGNIFICANT CHANGE UP (ref 8.4–10.5)
CHLORIDE SERPL-SCNC: 102 MMOL/L — SIGNIFICANT CHANGE UP (ref 96–108)
CO2 SERPL-SCNC: 29 MMOL/L — SIGNIFICANT CHANGE UP (ref 22–31)
CREAT SERPL-MCNC: 0.84 MG/DL — SIGNIFICANT CHANGE UP (ref 0.5–1.3)
EGFR: 86 ML/MIN/1.73M2 — SIGNIFICANT CHANGE UP
GLUCOSE BLDC GLUCOMTR-MCNC: 145 MG/DL — HIGH (ref 70–99)
GLUCOSE BLDC GLUCOMTR-MCNC: 200 MG/DL — HIGH (ref 70–99)
GLUCOSE SERPL-MCNC: 155 MG/DL — HIGH (ref 70–99)
HCT VFR BLD CALC: 31.4 % — LOW (ref 39–50)
HGB BLD-MCNC: 10 G/DL — LOW (ref 13–17)
MCHC RBC-ENTMCNC: 30.5 PG — SIGNIFICANT CHANGE UP (ref 27–34)
MCHC RBC-ENTMCNC: 31.8 GM/DL — LOW (ref 32–36)
MCV RBC AUTO: 95.7 FL — SIGNIFICANT CHANGE UP (ref 80–100)
NRBC # BLD: 0 /100 WBCS — SIGNIFICANT CHANGE UP (ref 0–0)
PLATELET # BLD AUTO: 341 K/UL — SIGNIFICANT CHANGE UP (ref 150–400)
POTASSIUM SERPL-MCNC: 3.5 MMOL/L — SIGNIFICANT CHANGE UP (ref 3.5–5.3)
POTASSIUM SERPL-SCNC: 3.5 MMOL/L — SIGNIFICANT CHANGE UP (ref 3.5–5.3)
PROT SERPL-MCNC: 6.5 G/DL — SIGNIFICANT CHANGE UP (ref 6–8.3)
RBC # BLD: 3.28 M/UL — LOW (ref 4.2–5.8)
RBC # FLD: 16.4 % — HIGH (ref 10.3–14.5)
SODIUM SERPL-SCNC: 140 MMOL/L — SIGNIFICANT CHANGE UP (ref 135–145)
WBC # BLD: 7.22 K/UL — SIGNIFICANT CHANGE UP (ref 3.8–10.5)
WBC # FLD AUTO: 7.22 K/UL — SIGNIFICANT CHANGE UP (ref 3.8–10.5)

## 2023-02-02 PROCEDURE — 99232 SBSQ HOSP IP/OBS MODERATE 35: CPT

## 2023-02-02 RX ORDER — LACTULOSE 10 G/15ML
20 SOLUTION ORAL
Refills: 0 | Status: COMPLETED | OUTPATIENT
Start: 2023-02-02 | End: 2023-02-02

## 2023-02-02 RX ORDER — INSULIN LISPRO 100/ML
VIAL (ML) SUBCUTANEOUS
Refills: 0 | Status: DISCONTINUED | OUTPATIENT
Start: 2023-02-02 | End: 2023-02-06

## 2023-02-02 RX ORDER — LACTULOSE 10 G/15ML
20 SOLUTION ORAL DAILY
Refills: 0 | Status: DISCONTINUED | OUTPATIENT
Start: 2023-02-03 | End: 2023-02-06

## 2023-02-02 RX ORDER — TIZANIDINE 4 MG/1
2 TABLET ORAL AT BEDTIME
Refills: 0 | Status: DISCONTINUED | OUTPATIENT
Start: 2023-02-02 | End: 2023-02-10

## 2023-02-02 RX ADMIN — ATORVASTATIN CALCIUM 80 MILLIGRAM(S): 80 TABLET, FILM COATED ORAL at 20:26

## 2023-02-02 RX ADMIN — POLYETHYLENE GLYCOL 3350 17 GRAM(S): 17 POWDER, FOR SOLUTION ORAL at 06:36

## 2023-02-02 RX ADMIN — Medication 5 MILLIGRAM(S): at 05:14

## 2023-02-02 RX ADMIN — LISINOPRIL 10 MILLIGRAM(S): 2.5 TABLET ORAL at 05:14

## 2023-02-02 RX ADMIN — OXYCODONE HYDROCHLORIDE 5 MILLIGRAM(S): 5 TABLET ORAL at 09:53

## 2023-02-02 RX ADMIN — FAMOTIDINE 20 MILLIGRAM(S): 10 INJECTION INTRAVENOUS at 12:15

## 2023-02-02 RX ADMIN — HEPARIN SODIUM 5000 UNIT(S): 5000 INJECTION INTRAVENOUS; SUBCUTANEOUS at 18:46

## 2023-02-02 RX ADMIN — Medication 25 MILLIGRAM(S): at 18:50

## 2023-02-02 RX ADMIN — TAMSULOSIN HYDROCHLORIDE 0.4 MILLIGRAM(S): 0.4 CAPSULE ORAL at 20:25

## 2023-02-02 RX ADMIN — LACTULOSE 20 GRAM(S): 10 SOLUTION ORAL at 12:15

## 2023-02-02 RX ADMIN — METFORMIN HYDROCHLORIDE 500 MILLIGRAM(S): 850 TABLET ORAL at 16:58

## 2023-02-02 RX ADMIN — OXYCODONE HYDROCHLORIDE 5 MILLIGRAM(S): 5 TABLET ORAL at 10:19

## 2023-02-02 RX ADMIN — METFORMIN HYDROCHLORIDE 500 MILLIGRAM(S): 850 TABLET ORAL at 08:29

## 2023-02-02 RX ADMIN — Medication 2: at 16:58

## 2023-02-02 RX ADMIN — HEPARIN SODIUM 5000 UNIT(S): 5000 INJECTION INTRAVENOUS; SUBCUTANEOUS at 05:14

## 2023-02-02 RX ADMIN — Medication 25 MILLIGRAM(S): at 05:14

## 2023-02-02 RX ADMIN — TIZANIDINE 2 MILLIGRAM(S): 4 TABLET ORAL at 20:26

## 2023-02-02 NOTE — PROGRESS NOTE ADULT - SUBJECTIVE AND OBJECTIVE BOX
Patient is a 87y old  Male who presents with a chief complaint of Lumbar Radiculopathy (02 Feb 2023 08:58)      Patient seen and examined at bedside.  No overnight events  No complaints this morning    ALLERGIES:  clindamycin (Unknown)  gentamicin (Unknown)    MEDICATIONS  (STANDING):  atorvastatin 80 milliGRAM(s) Oral at bedtime  cholecalciferol 2000 Unit(s) Oral <User Schedule>  dextrose 5%. 1000 milliLiter(s) (100 mL/Hr) IV Continuous <Continuous>  dextrose 5%. 1000 milliLiter(s) (50 mL/Hr) IV Continuous <Continuous>  dextrose 50% Injectable 25 Gram(s) IV Push once  dextrose 50% Injectable 12.5 Gram(s) IV Push once  dextrose 50% Injectable 25 Gram(s) IV Push once  famotidine    Tablet 20 milliGRAM(s) Oral daily  glucagon  Injectable 1 milliGRAM(s) IntraMuscular once  heparin   Injectable 5000 Unit(s) SubCutaneous every 12 hours  insulin glargine Injectable (LANTUS) 9 Unit(s) SubCutaneous at bedtime  insulin lispro (ADMELOG) corrective regimen sliding scale   SubCutaneous two times a day before meals  lactulose Syrup 20 Gram(s) Oral two times a day  lisinopril 10 milliGRAM(s) Oral daily  metFORMIN 500 milliGRAM(s) Oral two times a day with meals  metoprolol tartrate 25 milliGRAM(s) Oral two times a day  polyethylene glycol 3350 17 Gram(s) Oral two times a day  senna 2 Tablet(s) Oral at bedtime  tamsulosin 0.4 milliGRAM(s) Oral at bedtime  tiZANidine 2 milliGRAM(s) Oral at bedtime    MEDICATIONS  (PRN):  acetaminophen     Tablet .. 650 milliGRAM(s) Oral every 6 hours PRN Temp greater or equal to 38C (100.4F), Mild Pain (1 - 3)  dextrose Oral Gel 15 Gram(s) Oral once PRN Blood Glucose LESS THAN 70 milliGRAM(s)/deciliter  oxyCODONE    IR 5 milliGRAM(s) Oral every 6 hours PRN Moderate Pain (4 - 6)    Vital Signs Last 24 Hrs  T(F): 97.8 (02 Feb 2023 05:08), Max: 98.9 (01 Feb 2023 20:20)  HR: 76 (02 Feb 2023 05:08) (76 - 93)  BP: 118/75 (02 Feb 2023 05:08) (118/75 - 135/75)  RR: 16 (02 Feb 2023 05:08) (16 - 18)  SpO2: 95% (02 Feb 2023 05:08) (95% - 96%)  I&O's Summary    01 Feb 2023 07:01  -  02 Feb 2023 07:00  --------------------------------------------------------  IN: 0 mL / OUT: 550 mL / NET: -550 mL    PHYSICAL EXAM:  GENERAL: NAD  HENT:  Atraumatic, Normocephalic; No tonsillar erythema, exudates, or enlargement; Moist mucous membranes;   EYES: EOMI, PERRLA, conjunctiva and sclera clear, no lid-lag  NECK: Supple, No JVD, Normal thyroid  CHEST/LUNG: Clear to percussion bilaterally; No rales, rhonchi, wheezing, or rubs; normal respiratory effort, no intercostal retractions  HEART: Regular rate and rhythm; No murmurs, rubs, or gallops; No pitting edema  ABDOMEN: Soft, Nontender, Nondistended; Bowel sounds present; No HSM  MUSCULOSKELETAL/EXTREMITIES:  2+ Peripheral Pulses, No clubbing or digital cyanosis  PSYCH: Appropriate affect, Alert & Awake; Good judgement    LABS:                        10.0   7.22  )-----------( 341      ( 02 Feb 2023 07:30 )             31.4       02-02    140  |  102  |  11  ----------------------------<  155  3.5   |  29  |  0.84    Ca    8.7      02 Feb 2023 07:30    TPro  6.5  /  Alb  2.8  /  TBili  0.8  /  DBili  x   /  AST  70  /  ALT  79  /  AlkPhos  144  02-02     POCT Blood Glucose.: 145 mg/dL (02 Feb 2023 08:27)  POCT Blood Glucose.: 208 mg/dL (01 Feb 2023 21:00)  POCT Blood Glucose.: 136 mg/dL (01 Feb 2023 16:54)    COVID-19 PCR: NotDetebarb (01-27-23 @ 19:06)    Care Discussed with Rehab Attending and Other Providers

## 2023-02-02 NOTE — PROGRESS NOTE ADULT - ASSESSMENT
86 YO male with PMH of HTN, HLD,  Nonobstructive CAD, prostate cancer s/p RT,  DM II, Arthritis s/p Right total knee replacement in 2008, squamous cell carcinoma of scalp, BPH, ASHD, Chronic lower back pain secondary to Lumbar stenosis with neurogenic claudication, COVID 2020 who presented to [facility] on 1/24 with c/o  lower back pain radiating down to B/L legs that has  progressively worsened and has failed conservation treatments.    MRI of lumbar spine revealed  Convex left curvature with straightening of the lordosis, multilevel loss of disc signal and height and anterior spurring and bulging from L2-L3 through L5-S1. He is s/p right posterior spine  L3-L5 extreme lateral lumbar interbody fusion, L2-S1 posterior column osteotomy, facetectomy, foraminotomy, L3-L5 posterior instrumented fusion  paraspineous muscle flap wound closure on 1/24.  Patient now with gait Instability, ADL impairments and Functional impairments.    * Pain management- add warm or cold compress * Continue Melatonin @ HS * Continue to monitor functional progress *     #Lumbar Radiculopathy  - MRI of lumbar spine revealed  Convex left curvature with straightening of the lordosis, multilevel loss of disc signal and height and anterior spurring and bulging from L2-L3 through L5-S1.   - now s/p   L3-L5 extreme lateral lumbar interbody fusion, L2-S1 posterior column osteotomy, facetectomy, foraminotomy, L3-L5 posterior instrumented fusion  paraspineous muscle flap wound closure on 1/24.  - Surgical site with dermabond- well approximated  - Start Comprehensive Rehab Program: PT/OT 3hours daily and 5 days weekly  - PT: Focused on improving strength, endurance, coordination, balance, functional mobility, and transfers  - OT: Focused on improving strength, fine motor skills, coordination, posture and ADLs.      #HLD  - Lipitor 80mg daily    #HTN  - Lisinopril  10mg daily  - Metoprolol 25mg BID    #DM II  - ISS and FS  - Admelog and Lantus    #BPH  - Flomax 0.4mg daily    #Pain management  - Tylenol PRN, Oxycodone PRN, Zanaflex 2mg prn  - Warm or cold compress    #DVT ppx  -  Heparin, SCD, TEDs    #GI ppx  - Pepcid 20mg    #Bowel Regimen  - Senna  - Miralax BID 2/1  - Fleet enema x1 dose 2/1- with successful BM   - Previously constipated- now resolved s/p Fleet enema    #Bladder management  - BS on admission, and q 8 hours (SC if > 400)  - Monitor UO    #FEN   - Diet: Regular. Cardiac  - Supplements vit D, calcium    #Skin:  - Skin on admission: Thoracic spine incision + 2 drain site with surgiglue, Right flank incision with surgiglue BRANDI  - Pressure injury/Skin: Turn Q2hrs while in bed, OOB to Chair, PT/OT     #Sleep:   - Maintain quiet hours and low stim environment.  - Melatonin 3mgN to maximize participation in therapy during the day.     #Precaution  - Fall, Aspiration, cardiac, thoracic spine  ----------------------------------------------------------------  Outpatient Follow-up (Specialty/Name of physician):  PCP: Dr. Tamela Mckeon MD  Plastic Surgery  [address] [place] [address]  [address] [place] [address]. [id number] [phone]    Leo Smith MD  Neurosurgery  [address] [place] [address]  [address]  [place]. [address]  [phone]  ----------------------------------------------------------------   88 YO male with PMH of HTN, HLD,  Nonobstructive CAD, prostate cancer s/p RT,  DM II, Arthritis s/p Right total knee replacement in 2008, squamous cell carcinoma of scalp, BPH, ASHD, Chronic lower back pain secondary to Lumbar stenosis with neurogenic claudication, COVID 2020 who presented to [facility] on 1/24 with c/o  lower back pain radiating down to B/L legs that has  progressively worsened and has failed conservation treatments.    MRI of lumbar spine revealed  Convex left curvature with straightening of the lordosis, multilevel loss of disc signal and height and anterior spurring and bulging from L2-L3 through L5-S1. He is s/p right posterior spine  L3-L5 extreme lateral lumbar interbody fusion, L2-S1 posterior column osteotomy, facetectomy, foraminotomy, L3-L5 posterior instrumented fusion  paraspineous muscle flap wound closure on 1/24.  Patient now with gait Instability, ADL impairments and Functional impairments.    * Pain management- add warm or cold compress * Continue Melatonin @ HS * Continue to monitor functional progress * Bowel regimen adjusted- per hospitalist *     #Lumbar Radiculopathy  - MRI of lumbar spine revealed  Convex left curvature with straightening of the lordosis, multilevel loss of disc signal and height and anterior spurring and bulging from L2-L3 through L5-S1.   - now s/p   L3-L5 extreme lateral lumbar interbody fusion, L2-S1 posterior column osteotomy, facetectomy, foraminotomy, L3-L5 posterior instrumented fusion  paraspineous muscle flap wound closure on 1/24.  - Surgical site with dermabond- well approximated  - Start Comprehensive Rehab Program: PT/OT 3hours daily and 5 days weekly  - PT: Focused on improving strength, endurance, coordination, balance, functional mobility, and transfers  - OT: Focused on improving strength, fine motor skills, coordination, posture and ADLs.      #HLD  - Lipitor 80mg daily    #HTN  - Lisinopril  10mg daily  - Metoprolol 25mg BID    #DM II  - ISS and FS  - Admelog and Lantus    #BPH  - Flomax 0.4mg daily    #Pain management  - Tylenol PRN, Oxycodone PRN, Zanaflex 2mg prn  - Warm or cold compress    #DVT ppx  -  Heparin, SCD, TEDs    #GI ppx  - Pepcid 20mg    #Bowel Regimen  - Senna  - Miralax BID 2/1  - Fleet enema x1 dose 2/1- with successful BM   - Bowel regimen adjustment per hospitalist 2/2     #Bladder management  - BS on admission, and q 8 hours (SC if > 400)  - Monitor UO    #FEN   - Diet: Regular. Cardiac  - Supplements vit D, calcium    #Skin:  - Skin on admission: Thoracic spine incision + 2 drain site with surgiglue, Right flank incision with surgiglue BRANDI  - Pressure injury/Skin: Turn Q2hrs while in bed, OOB to Chair, PT/OT     #Sleep:   - Maintain quiet hours and low stim environment.  - Melatonin 3mgN to maximize participation in therapy during the day.     #Precaution  - Fall, Aspiration, cardiac, thoracic spine  ----------------------------------------------------------------  Outpatient Follow-up (Specialty/Name of physician):  PCP: Dr. Tamela Mckeon MD  Plastic Surgery  [address] [place] [address]  [address] [place] [address]. [id number] [phone]    Leo Smith MD  Neurosurgery  [address] [place] [address]  [address]  [place]. [address]  [phone]  ----------------------------------------------------------------   88 YO male with PMH of HTN, HLD,  Nonobstructive CAD, prostate cancer s/p RT,  DM II, Arthritis s/p Right total knee replacement in 2008, squamous cell carcinoma of scalp, BPH, ASHD, Chronic lower back pain secondary to Lumbar stenosis with neurogenic claudication, COVID 2020 who presented to [facility] on 1/24 with c/o  lower back pain radiating down to B/L legs that has  progressively worsened and has failed conservation treatments.    MRI of lumbar spine revealed  Convex left curvature with straightening of the lordosis, multilevel loss of disc signal and height and anterior spurring and bulging from L2-L3 through L5-S1. He is s/p right posterior spine  L3-L5 extreme lateral lumbar interbody fusion, L2-S1 posterior column osteotomy, facetectomy, foraminotomy, L3-L5 posterior instrumented fusion  paraspineous muscle flap wound closure on 1/24.  Patient now with gait Instability, ADL impairments and Functional impairments.    * Pain management- add warm or cold compress * Melatonin DCd, now Tizanidine 2 mg @ HS * Continue to monitor functional progress * Bowel regimen adjusted- per hospitalist *     #Lumbar Radiculopathy  - MRI of lumbar spine revealed  Convex left curvature with straightening of the lordosis, multilevel loss of disc signal and height and anterior spurring and bulging from L2-L3 through L5-S1.   - now s/p   L3-L5 extreme lateral lumbar interbody fusion, L2-S1 posterior column osteotomy, facetectomy, foraminotomy, L3-L5 posterior instrumented fusion  paraspineous muscle flap wound closure on 1/24.  - Surgical site with dermabond- well approximated  - Start Comprehensive Rehab Program: PT/OT 3hours daily and 5 days weekly  - PT: Focused on improving strength, endurance, coordination, balance, functional mobility, and transfers  - OT: Focused on improving strength, fine motor skills, coordination, posture and ADLs.      #HLD  - Lipitor 80mg daily    #HTN  - Lisinopril  10mg daily  - Metoprolol 25mg BID    #DM II  - ISS and FS  - Admelog and Lantus    #BPH  - Flomax 0.4mg daily    #Pain management  - Tylenol PRN, Oxycodone PRN, Zanaflex 2mg prn  - Warm or cold compress  - Tizanidine 2 mg @ HS     #DVT ppx  -  Heparin, SCD, TEDs    #GI ppx  - Pepcid 20mg    #Bowel Regimen  - Senna  - Miralax BID 2/1  - Fleet enema x1 dose 2/1- with successful BM   - Bowel regimen adjustment per hospitalist 2/2     #Bladder management  - BS on admission, and q 8 hours (SC if > 400)  - Monitor UO    #FEN   - Diet: Regular. Cardiac  - Supplements vit D, calcium    #Skin:  - Skin on admission: Thoracic spine incision + 2 drain site with surgiglue, Right flank incision with surgiglue BRANDI  - Pressure injury/Skin: Turn Q2hrs while in bed, OOB to Chair, PT/OT     #Sleep:   - Maintain quiet hours and low stim environment.  - Melatonin Dcd on 2/2  -- Tizanidine 2 mg @ HS    #Precaution  - Fall, Aspiration, cardiac, thoracic spine  ----------------------------------------------------------------  Outpatient Follow-up (Specialty/Name of physician):  PCP: Dr. Tamela Mckeon MD  Plastic Surgery  [address] [place] [address]  [address] [place] [address]. [id number] [phone]    Leo Smith MD  Neurosurgery  [address] [place] [address]  [address]  [place]. [address]  [phone]  ----------------------------------------------------------------   88 YO male with PMH of HTN, HLD,  Nonobstructive CAD, prostate cancer s/p RT,  DM II, Arthritis s/p Right total knee replacement in 2008, squamous cell carcinoma of scalp, BPH, ASHD, Chronic lower back pain secondary to Lumbar stenosis with neurogenic claudication, COVID 2020 who presented to [facility] on 1/24 with c/o  lower back pain radiating down to B/L legs that has  progressively worsened and has failed conservation treatments.    MRI of lumbar spine revealed  Convex left curvature with straightening of the lordosis, multilevel loss of disc signal and height and anterior spurring and bulging from L2-L3 through L5-S1. He is s/p right posterior spine  L3-L5 extreme lateral lumbar interbody fusion, L2-S1 posterior column osteotomy, facetectomy, foraminotomy, L3-L5 posterior instrumented fusion  paraspineous muscle flap wound closure on 1/24.  Patient now with gait Instability, ADL impairments and Functional impairments.    * Pain management- add warm or cold compress * Melatonin DCd, now Tizanidine 2 mg @ HS * Continue to monitor functional progress * Bowel regimen adjusted- per hospitalist *     #Lumbar Radiculopathy  - MRI of lumbar spine revealed  Convex left curvature with straightening of the lordosis, multilevel loss of disc signal and height and anterior spurring and bulging from L2-L3 through L5-S1.   - now s/p   L3-L5 extreme lateral lumbar interbody fusion, L2-S1 posterior column osteotomy, facetectomy, foraminotomy, L3-L5 posterior instrumented fusion  paraspineous muscle flap wound closure on 1/24.  - Surgical site with dermabond- well approximated  - Start Comprehensive Rehab Program: PT/OT 3hours daily and 5 days weekly  - PT: Focused on improving strength, endurance, coordination, balance, functional mobility, and transfers  - OT: Focused on improving strength, fine motor skills, coordination, posture and ADLs.      #HLD  - Lipitor 80mg daily    #HTN  - Lisinopril  10mg daily  - Metoprolol 25mg BID    #DM II  - ISS and FS  - Admelog and Lantus    #BPH  - Flomax 0.4mg daily    #Pain management  - Tylenol PRN, Oxycodone PRN, Zanaflex 2mg prn  - Warm or cold compress  - Tizanidine 2 mg @ HS     #DVT ppx  -  Heparin, SCD, TEDs    #GI ppx  - Pepcid 20mg    #Bowel Regimen  - Senna  - Miralax BID 2/1  - Fleet enema x1 dose 2/1- with successful BM   - Bowel regimen adjustment per hospitalist 2/2     #Bladder management  - BS on admission, and q 8 hours (SC if > 400)  - Monitor UO    #FEN   - Diet: Regular. Cardiac  - Supplements vit D, calcium    #Skin:  - Skin on admission: Thoracic spine incision + 2 drain site with surgiglue, Right flank incision with surgiglue BRANDI  - Pressure injury/Skin: Turn Q2hrs while in bed, OOB to Chair, PT/OT     #Sleep:   - Maintain quiet hours and low stim environment.  - Melatonin Dcd on 2/2  -- Tizanidine 2 mg @ HS    #Precaution  - Fall, Aspiration, cardiac, thoracic spine    IDT conference on 2/2  TDD: 2/10 to home  Barriers: Pain  Social Work: Lives in  with 3STE with 1st [place] setup. Independent PTA.  OT: Min A/CGA for ADLs and transfers. Mod A for shower transfer.  PT: Min A with RW for transfers. Ambulated 50 ft with RW with min A. Negotiated 8 (6-inch) stairs with 2 HRs.   SLP: --   ----------------------------------------------------------------  Outpatient Follow-up (Specialty/Name of physician):  PCP: Dr. Tamela Mckeon MD  Plastic Surgery  [address] [place] [address]  [address]  [place]. [id number] [phone]    Leo Smith MD  Neurosurgery  [address] [place] [address]  [address]  [place]. [address]  [phone]  ----------------------------------------------------------------

## 2023-02-02 NOTE — PROGRESS NOTE ADULT - ASSESSMENT
This is an 86 y/o M with PMH of HTN, HLD, Nonobstructive CAD, prostate cancer s/p RT, T2DM, Arthritis s/p Right total knee replacement in 2008, squamous cell carcinoma of scalp, BPH, ASHD, Chronic lower back pain secondary to Lumbar stenosis with neurogenic claudication, COVID 2020 who presented to [facility] on 1/24 with c/o lower back pain radiating down to B/L legs that has progressively worsened and has failed conservation treatments. MRI of lumbar spine revealed  Convex left curvature with straightening of the lordosis, multilevel loss of disc signal and height and anterior spurring and bulging from L2-L3 through L5-S1. He is s/p right posterior spine L3-L5 extreme lateral lumbar interbody fusion, L2-S1 posterior column osteotomy, facetectomy, foraminotomy, L3-L5 posterior instrumented fusion paraspinous muscle flap wound closure on 1/24. Patient now with gait Instability, ADL impairments and Functional impairments.    #Lumbar Radiculopathy  -MRI of lumbar spine revealed Convex left curvature with straightening of the lordosis, multilevel loss of disc signal and height and anterior spurring and bulging from L2-L3 through L5-S1  -Now s/p L3-L5 extreme lateral lumbar interbody fusion, L2-S1 posterior column osteotomy, facetectomy, foraminotomy, L3-L5 posterior instrumented fusion paraspinous muscle flap wound closure on 1/24  - tolerating comprehensive rehab program   -Pain management, bowel regimen per rehab     # Anemia  - no active s/s bleeding, likely post op anemia,   - continue to monitor    #HLD  -Lipitor     #HTN  -Continue Lisinopril and Metoprolol    #DM II and hyperglycemia  - A1c 8.5  -ISS and FS  -D/C lantus and continue metformin 500 mg BID    #BPH  -Flomax     #Transaminitis  -Could be due to tizanidine  -Trend    #DVT ppx - HSQ  #GI ppx - Pepcid

## 2023-02-02 NOTE — PROGRESS NOTE ADULT - SUBJECTIVE AND OBJECTIVE BOX
CC: Lumbar Radiculopathy    Today's Subjective & Objective Findings:  Patient seen and examined at bedside.  States that he slept well last night.   Last BM on 2/1, after enema.   Admits back pain 5/10 during encouter, but he feels like his back pain is improving overall.   Discussed use of warm or cold compress to assist with pain- pt agreeable.   No other complaints.    Denies headache, dizziness, visual changes, chest pain, SOB/LUNA, abdominal pain, nausea, vomiting, diarrhea, dysuria, numbness or tingling.    Therapy-- engaging, motivated.  Pain is a limiting factor.  Feels like therapies are going well.     Vital Signs Last 24 Hrs  T(C): 36.6 (02 Feb 2023 05:08), Max: 37.2 (01 Feb 2023 20:20)  T(F): 97.8 (02 Feb 2023 05:08), Max: 98.9 (01 Feb 2023 20:20)  HR: 76 (02 Feb 2023 05:08) (76 - 93)  BP: 118/75 (02 Feb 2023 05:08) (118/75 - 135/75)  BP(mean): --  RR: 16 (02 Feb 2023 05:08) (16 - 18)  SpO2: 95% (02 Feb 2023 05:08) (95% - 96%)    PHYSICAL EXAM:  Gen - NAD, Comfortable  HEENT - NCAT, EOMI, MMM  Neck - Supple, No limited ROM  Pulm - CTAB, No wheeze, No rhonchi, No crackles  Cardiovascular - RRR, S1S2  Chest - good chest expansion, good respiratory effort  Abdomen - Soft, NT/ND, +BS  Extremities - No Cyanosis, no clubbing, no edema, no calf tenderness  Neuro-     Cognitive - awake, alert, oriented to person, place, date, year, and situation.  Able to follow command     Communication - Fluent     Attention: Intact,      Judgement: Good evidence of judgement     Cranial Nerves - CN 2-12 intact.     Motor -                     LEFT    UE - 4+/5                    RIGHT UE - 4+/5                    LEFT    LE - 3-/5                    RIGHT LE - 3/5        Sensory - Intact to LT      Reflexes - DTR Intact, No primitive reflexive  Skin:  thoracic spine incision + 2 drain site with surgiglue, Right flank incision with surgiglue BRANDI      LAB                        10.0   7.22  )-----------( 341      ( 02 Feb 2023 07:30 )             31.4     02-02    140  |  102  |  11  ----------------------------<  155<H>  3.5   |  29  |  0.84    Ca    8.7      02 Feb 2023 07:30    TPro  6.5  /  Alb  2.8<L>  /  TBili  0.8  /  DBili  x   /  AST  70<H>  /  ALT  79<H>  /  AlkPhos  144<H>  02-02    LIVER FUNCTIONS - ( 02 Feb 2023 07:30 )  Alb: 2.8 g/dL / Pro: 6.5 g/dL / ALK PHOS: 144 U/L / ALT: 79 U/L / AST: 70 U/L / GGT: x             MEDICATIONS  (STANDING):  atorvastatin 80 milliGRAM(s) Oral at bedtime  bisacodyl 5 milliGRAM(s) Oral every 12 hours  bisacodyl Suppository 10 milliGRAM(s) Rectal once  cholecalciferol 2000 Unit(s) Oral <User Schedule>  dextrose 5%. 1000 milliLiter(s) (50 mL/Hr) IV Continuous <Continuous>  dextrose 5%. 1000 milliLiter(s) (100 mL/Hr) IV Continuous <Continuous>  dextrose 50% Injectable 25 Gram(s) IV Push once  dextrose 50% Injectable 12.5 Gram(s) IV Push once  dextrose 50% Injectable 25 Gram(s) IV Push once  famotidine    Tablet 20 milliGRAM(s) Oral daily  glucagon  Injectable 1 milliGRAM(s) IntraMuscular once  heparin   Injectable 5000 Unit(s) SubCutaneous every 12 hours  insulin glargine Injectable (LANTUS) 9 Unit(s) SubCutaneous at bedtime  insulin lispro (ADMELOG) corrective regimen sliding scale   SubCutaneous three times a day before meals  insulin lispro (ADMELOG) corrective regimen sliding scale   SubCutaneous at bedtime  lactulose Syrup 10 Gram(s) Oral once  lisinopril 10 milliGRAM(s) Oral daily  metoprolol tartrate 25 milliGRAM(s) Oral two times a day  polyethylene glycol 3350 17 Gram(s) Oral daily  senna 2 Tablet(s) Oral at bedtime  tamsulosin 0.4 milliGRAM(s) Oral at bedtime    MEDICATIONS  (PRN):  acetaminophen     Tablet .. 650 milliGRAM(s) Oral every 6 hours PRN Temp greater or equal to 38C (100.4F), Mild Pain (1 - 3)  dextrose Oral Gel 15 Gram(s) Oral once PRN Blood Glucose LESS THAN 70 milliGRAM(s)/deciliter  melatonin 3 milliGRAM(s) Oral at bedtime PRN Insomnia  oxyCODONE    IR 5 milliGRAM(s) Oral every 6 hours PRN Moderate Pain (4 - 6)  tiZANidine 2 milliGRAM(s) Oral every 8 hours PRN Muscle Spasm

## 2023-02-03 LAB
GLUCOSE BLDC GLUCOMTR-MCNC: 153 MG/DL — HIGH (ref 70–99)
GLUCOSE BLDC GLUCOMTR-MCNC: 174 MG/DL — HIGH (ref 70–99)
GLUCOSE BLDC GLUCOMTR-MCNC: 187 MG/DL — HIGH (ref 70–99)

## 2023-02-03 PROCEDURE — 99232 SBSQ HOSP IP/OBS MODERATE 35: CPT

## 2023-02-03 RX ORDER — METFORMIN HYDROCHLORIDE 850 MG/1
1000 TABLET ORAL
Refills: 0 | Status: DISCONTINUED | OUTPATIENT
Start: 2023-02-03 | End: 2023-02-10

## 2023-02-03 RX ORDER — LIDOCAINE 4 G/100G
1 CREAM TOPICAL ONCE
Refills: 0 | Status: COMPLETED | OUTPATIENT
Start: 2023-02-03 | End: 2023-02-03

## 2023-02-03 RX ORDER — LIDOCAINE 4 G/100G
1 CREAM TOPICAL
Refills: 0 | Status: DISCONTINUED | OUTPATIENT
Start: 2023-02-04 | End: 2023-02-10

## 2023-02-03 RX ORDER — OXYCODONE HYDROCHLORIDE 5 MG/1
5 TABLET ORAL EVERY 6 HOURS
Refills: 0 | Status: DISCONTINUED | OUTPATIENT
Start: 2023-02-03 | End: 2023-02-07

## 2023-02-03 RX ADMIN — Medication 650 MILLIGRAM(S): at 05:26

## 2023-02-03 RX ADMIN — TAMSULOSIN HYDROCHLORIDE 0.4 MILLIGRAM(S): 0.4 CAPSULE ORAL at 20:51

## 2023-02-03 RX ADMIN — Medication 650 MILLIGRAM(S): at 06:26

## 2023-02-03 RX ADMIN — Medication 650 MILLIGRAM(S): at 21:51

## 2023-02-03 RX ADMIN — Medication 2: at 08:08

## 2023-02-03 RX ADMIN — LIDOCAINE 1 PATCH: 4 CREAM TOPICAL at 20:46

## 2023-02-03 RX ADMIN — FAMOTIDINE 20 MILLIGRAM(S): 10 INJECTION INTRAVENOUS at 12:19

## 2023-02-03 RX ADMIN — LISINOPRIL 10 MILLIGRAM(S): 2.5 TABLET ORAL at 05:26

## 2023-02-03 RX ADMIN — Medication 25 MILLIGRAM(S): at 05:26

## 2023-02-03 RX ADMIN — METFORMIN HYDROCHLORIDE 1000 MILLIGRAM(S): 850 TABLET ORAL at 08:07

## 2023-02-03 RX ADMIN — LIDOCAINE 1 PATCH: 4 CREAM TOPICAL at 08:52

## 2023-02-03 RX ADMIN — Medication 650 MILLIGRAM(S): at 20:51

## 2023-02-03 RX ADMIN — OXYCODONE HYDROCHLORIDE 5 MILLIGRAM(S): 5 TABLET ORAL at 18:11

## 2023-02-03 RX ADMIN — LIDOCAINE 1 PATCH: 4 CREAM TOPICAL at 20:54

## 2023-02-03 RX ADMIN — HEPARIN SODIUM 5000 UNIT(S): 5000 INJECTION INTRAVENOUS; SUBCUTANEOUS at 17:42

## 2023-02-03 RX ADMIN — METFORMIN HYDROCHLORIDE 1000 MILLIGRAM(S): 850 TABLET ORAL at 17:42

## 2023-02-03 RX ADMIN — TIZANIDINE 2 MILLIGRAM(S): 4 TABLET ORAL at 20:50

## 2023-02-03 RX ADMIN — Medication 2: at 17:04

## 2023-02-03 RX ADMIN — Medication 25 MILLIGRAM(S): at 17:42

## 2023-02-03 RX ADMIN — OXYCODONE HYDROCHLORIDE 5 MILLIGRAM(S): 5 TABLET ORAL at 09:15

## 2023-02-03 RX ADMIN — OXYCODONE HYDROCHLORIDE 5 MILLIGRAM(S): 5 TABLET ORAL at 08:37

## 2023-02-03 RX ADMIN — ATORVASTATIN CALCIUM 80 MILLIGRAM(S): 80 TABLET, FILM COATED ORAL at 20:51

## 2023-02-03 RX ADMIN — OXYCODONE HYDROCHLORIDE 5 MILLIGRAM(S): 5 TABLET ORAL at 18:41

## 2023-02-03 RX ADMIN — Medication 2000 UNIT(S): at 08:07

## 2023-02-03 RX ADMIN — HEPARIN SODIUM 5000 UNIT(S): 5000 INJECTION INTRAVENOUS; SUBCUTANEOUS at 05:25

## 2023-02-03 NOTE — PROGRESS NOTE ADULT - SUBJECTIVE AND OBJECTIVE BOX
CC: Lumbar Radiculopathy    Today's Subjective & Objective Findings:  Patient seen and examined at bedside.  States that he slept well last night.   Last BM on 2/2.  Still with slight R lower back pain this morning- discussed Lidocaine patch- pt agreeable.  Discussed cold compress during daytime and warm compress at nighttime.   No other complaints.    Denies headache, dizziness, visual changes, chest pain, SOB/LUNA, abdominal pain, nausea, vomiting, diarrhea, dysuria, numbness or tingling.    Therapy-- engaging, motivated.  Pain is a limiting factor.  Feels like therapies are going well, and that he is getting stronger.     Vital Signs Last 24 Hrs  T(C): 36.6 (03 Feb 2023 05:23), Max: 36.9 (02 Feb 2023 20:24)  T(F): 97.9 (03 Feb 2023 05:23), Max: 98.5 (02 Feb 2023 20:24)  HR: 92 (03 Feb 2023 05:23) (80 - 92)  BP: 130/75 (03 Feb 2023 05:23) (130/75 - 132/74)  BP(mean): --  RR: 16 (03 Feb 2023 05:23) (16 - 16)  SpO2: 98% (03 Feb 2023 05:23) (97% - 98%)    PHYSICAL EXAM:  Gen - NAD, Comfortable  HEENT - NCAT, EOMI, MMM  Neck - Supple, No limited ROM  Pulm - CTAB, No wheeze, No rhonchi, No crackles  Cardiovascular - RRR, S1S2  Chest - good chest expansion, good respiratory effort  Abdomen - Soft, NT/ND, +BS  Extremities - No Cyanosis, no clubbing, no edema, no calf tenderness  Neuro-     Cognitive - awake, alert, oriented to person, place, date, year, and situation.  Able to follow command     Communication - Fluent     Attention: Intact,      Judgement: Good evidence of judgement     Cranial Nerves - CN 2-12 intact.     Motor -                     LEFT    UE - 4+/5                    RIGHT UE - 4+/5                    LEFT    LE - 3-/5                    RIGHT LE - 3/5        Sensory - Intact to LT      Reflexes - DTR Intact, No primitive reflexive  Skin:  thoracic spine incision + 2 drain site with surgiglue, Right flank incision with surgiglue BRANDI      LAB                        10.0   7.22  )-----------( 341      ( 02 Feb 2023 07:30 )             31.4     02-02    140  |  102  |  11  ----------------------------<  155<H>  3.5   |  29  |  0.84    Ca    8.7      02 Feb 2023 07:30    TPro  6.5  /  Alb  2.8<L>  /  TBili  0.8  /  DBili  x   /  AST  70<H>  /  ALT  79<H>  /  AlkPhos  144<H>  02-02    LIVER FUNCTIONS - ( 02 Feb 2023 07:30 )  Alb: 2.8 g/dL / Pro: 6.5 g/dL / ALK PHOS: 144 U/L / ALT: 79 U/L / AST: 70 U/L / GGT: x             MEDICATIONS  (STANDING):  atorvastatin 80 milliGRAM(s) Oral at bedtime  bisacodyl 5 milliGRAM(s) Oral every 12 hours  bisacodyl Suppository 10 milliGRAM(s) Rectal once  cholecalciferol 2000 Unit(s) Oral <User Schedule>  dextrose 5%. 1000 milliLiter(s) (50 mL/Hr) IV Continuous <Continuous>  dextrose 5%. 1000 milliLiter(s) (100 mL/Hr) IV Continuous <Continuous>  dextrose 50% Injectable 25 Gram(s) IV Push once  dextrose 50% Injectable 12.5 Gram(s) IV Push once  dextrose 50% Injectable 25 Gram(s) IV Push once  famotidine    Tablet 20 milliGRAM(s) Oral daily  glucagon  Injectable 1 milliGRAM(s) IntraMuscular once  heparin   Injectable 5000 Unit(s) SubCutaneous every 12 hours  insulin glargine Injectable (LANTUS) 9 Unit(s) SubCutaneous at bedtime  insulin lispro (ADMELOG) corrective regimen sliding scale   SubCutaneous three times a day before meals  insulin lispro (ADMELOG) corrective regimen sliding scale   SubCutaneous at bedtime  lactulose Syrup 10 Gram(s) Oral once  lisinopril 10 milliGRAM(s) Oral daily  metoprolol tartrate 25 milliGRAM(s) Oral two times a day  polyethylene glycol 3350 17 Gram(s) Oral daily  senna 2 Tablet(s) Oral at bedtime  tamsulosin 0.4 milliGRAM(s) Oral at bedtime    MEDICATIONS  (PRN):  acetaminophen     Tablet .. 650 milliGRAM(s) Oral every 6 hours PRN Temp greater or equal to 38C (100.4F), Mild Pain (1 - 3)  dextrose Oral Gel 15 Gram(s) Oral once PRN Blood Glucose LESS THAN 70 milliGRAM(s)/deciliter  melatonin 3 milliGRAM(s) Oral at bedtime PRN Insomnia  oxyCODONE    IR 5 milliGRAM(s) Oral every 6 hours PRN Moderate Pain (4 - 6)  tiZANidine 2 milliGRAM(s) Oral every 8 hours PRN Muscle Spasm CC: Lumbar Radiculopathy    Today's Subjective & Objective Findings:  Patient seen and examined at bedside.  States that he slept well last night.   Last BM on 2/2.  Still with slight R lower back pain this morning- discussed Lidocaine patch- pt agreeable.  Discussed cold compress during daytime and warm compress at nighttime.   No other complaints.    Denies headache, dizziness, visual changes, chest pain, SOB/LUNA, abdominal pain, nausea, vomiting, diarrhea, dysuria, numbness or tingling.    Therapy-- engaging, motivated.  Pain is a limiting factor.  Feels like therapies are going well, and that he is getting stronger.     Vital Signs Last 24 Hrs  T(C): 36.6 (03 Feb 2023 05:23), Max: 36.9 (02 Feb 2023 20:24)  T(F): 97.9 (03 Feb 2023 05:23), Max: 98.5 (02 Feb 2023 20:24)  HR: 92 (03 Feb 2023 05:23) (80 - 92)  BP: 130/75 (03 Feb 2023 05:23) (130/75 - 132/74)  BP(mean): --  RR: 16 (03 Feb 2023 05:23) (16 - 16)  SpO2: 98% (03 Feb 2023 05:23) (97% - 98%)    PHYSICAL EXAM:  Gen - NAD, Comfortable  HEENT - NCAT, EOMI, MMM  Neck - Supple, No limited ROM  Pulm - CTAB, No wheeze, No rhonchi, No crackles  Cardiovascular - RRR, S1S2  Chest - good chest expansion, good respiratory effort  Abdomen - Soft, NT/ND, +BS  Extremities - No Cyanosis, no clubbing, no edema, no calf tenderness  Neuro-      Cognitive - awake, alert, oriented to person, place, date, year, and situation.  Able to follow command     Communication - Fluent     Attention: Intact,      Judgement: Good evidence of judgement     Cranial Nerves - CN 2-12 intact.     Motor -                     LEFT    UE - 4+/5                    RIGHT UE - 4+/5                    LEFT    LE - 3-/5                    RIGHT LE - 3/5        Sensory - Intact to LT      Reflexes - DTR Intact, No primitive reflexive  Skin:  thoracic spine incision + 2 drain site with surgiglue, Right flank incision with surgiglue BRANDI      LAB                        10.0   7.22  )-----------( 341      ( 02 Feb 2023 07:30 )             31.4     02-02    140  |  102  |  11  ----------------------------<  155<H>  3.5   |  29  |  0.84    Ca    8.7      02 Feb 2023 07:30    TPro  6.5  /  Alb  2.8<L>  /  TBili  0.8  /  DBili  x   /  AST  70<H>  /  ALT  79<H>  /  AlkPhos  144<H>  02-02    LIVER FUNCTIONS - ( 02 Feb 2023 07:30 )  Alb: 2.8 g/dL / Pro: 6.5 g/dL / ALK PHOS: 144 U/L / ALT: 79 U/L / AST: 70 U/L / GGT: x             MEDICATIONS  (STANDING):  atorvastatin 80 milliGRAM(s) Oral at bedtime  bisacodyl 5 milliGRAM(s) Oral every 12 hours  bisacodyl Suppository 10 milliGRAM(s) Rectal once  cholecalciferol 2000 Unit(s) Oral <User Schedule>  dextrose 5%. 1000 milliLiter(s) (50 mL/Hr) IV Continuous <Continuous>  dextrose 5%. 1000 milliLiter(s) (100 mL/Hr) IV Continuous <Continuous>  dextrose 50% Injectable 25 Gram(s) IV Push once  dextrose 50% Injectable 12.5 Gram(s) IV Push once  dextrose 50% Injectable 25 Gram(s) IV Push once  famotidine    Tablet 20 milliGRAM(s) Oral daily  glucagon  Injectable 1 milliGRAM(s) IntraMuscular once  heparin   Injectable 5000 Unit(s) SubCutaneous every 12 hours  insulin glargine Injectable (LANTUS) 9 Unit(s) SubCutaneous at bedtime  insulin lispro (ADMELOG) corrective regimen sliding scale   SubCutaneous three times a day before meals  insulin lispro (ADMELOG) corrective regimen sliding scale   SubCutaneous at bedtime  lactulose Syrup 10 Gram(s) Oral once  lisinopril 10 milliGRAM(s) Oral daily  metoprolol tartrate 25 milliGRAM(s) Oral two times a day  polyethylene glycol 3350 17 Gram(s) Oral daily  senna 2 Tablet(s) Oral at bedtime  tamsulosin 0.4 milliGRAM(s) Oral at bedtime    MEDICATIONS  (PRN):  acetaminophen     Tablet .. 650 milliGRAM(s) Oral every 6 hours PRN Temp greater or equal to 38C (100.4F), Mild Pain (1 - 3)  dextrose Oral Gel 15 Gram(s) Oral once PRN Blood Glucose LESS THAN 70 milliGRAM(s)/deciliter  melatonin 3 milliGRAM(s) Oral at bedtime PRN Insomnia  oxyCODONE    IR 5 milliGRAM(s) Oral every 6 hours PRN Moderate Pain (4 - 6)  tiZANidine 2 milliGRAM(s) Oral every 8 hours PRN Muscle Spasm

## 2023-02-03 NOTE — PROGRESS NOTE ADULT - ASSESSMENT
This is an 86 y/o M with PMH of HTN, HLD, Nonobstructive CAD, prostate cancer s/p RT, T2DM, Arthritis s/p Right total knee replacement in 2008, squamous cell carcinoma of scalp, BPH, ASHD, Chronic lower back pain secondary to Lumbar stenosis with neurogenic claudication, COVID 2020 who presented to [facility] on 1/24 with c/o lower back pain radiating down to B/L legs that has progressively worsened and has failed conservation treatments. MRI of lumbar spine revealed  Convex left curvature with straightening of the lordosis, multilevel loss of disc signal and height and anterior spurring and bulging from L2-L3 through L5-S1. He is s/p right posterior spine L3-L5 extreme lateral lumbar interbody fusion, L2-S1 posterior column osteotomy, facetectomy, foraminotomy, L3-L5 posterior instrumented fusion paraspinous muscle flap wound closure on 1/24. Patient now with gait Instability, ADL impairments and Functional impairments.    #Lumbar Radiculopathy  -MRI of lumbar spine revealed Convex left curvature with straightening of the lordosis, multilevel loss of disc signal and height and anterior spurring and bulging from L2-L3 through L5-S1  -Now s/p L3-L5 extreme lateral lumbar interbody fusion, L2-S1 posterior column osteotomy, facetectomy, foraminotomy, L3-L5 posterior instrumented fusion paraspinous muscle flap wound closure on 1/24  -Tolerating comprehensive rehab program   -Pain management, bowel regimen per rehab     # Anemia  - no active s/s bleeding, likely post op anemia   - continue to monitor    #HLD  -Continue Lipitor     #HTN  -Continue Lisinopril and Metoprolol    #DM II and hyperglycemia  - A1c 8.5  -ISS and FS  -D/C'd lantus. increased metformin 1000 mg BID    #BPH  -Continue Flomax     #Transaminitis  -Could be due to tizanidine  -Trend    #DVT ppx - HSQ  #GI ppx - Pepcid

## 2023-02-03 NOTE — PROGRESS NOTE ADULT - SUBJECTIVE AND OBJECTIVE BOX
Patient is a 87y old  Male who presents with a chief complaint of Lumbar Radiculopathy (03 Feb 2023 08:40)      Patient seen and examined at bedside.  No overnight events  No complaints this morning    ALLERGIES:  clindamycin (Unknown)  gentamicin (Unknown)    MEDICATIONS  (STANDING):  atorvastatin 80 milliGRAM(s) Oral at bedtime  cholecalciferol 2000 Unit(s) Oral <User Schedule>  dextrose 5%. 1000 milliLiter(s) (100 mL/Hr) IV Continuous <Continuous>  dextrose 5%. 1000 milliLiter(s) (50 mL/Hr) IV Continuous <Continuous>  dextrose 50% Injectable 25 Gram(s) IV Push once  dextrose 50% Injectable 12.5 Gram(s) IV Push once  dextrose 50% Injectable 25 Gram(s) IV Push once  famotidine    Tablet 20 milliGRAM(s) Oral daily  glucagon  Injectable 1 milliGRAM(s) IntraMuscular once  heparin   Injectable 5000 Unit(s) SubCutaneous every 12 hours  insulin lispro (ADMELOG) corrective regimen sliding scale   SubCutaneous two times a day before meals  lactulose Syrup 20 Gram(s) Oral daily  lisinopril 10 milliGRAM(s) Oral daily  metFORMIN 1000 milliGRAM(s) Oral two times a day with meals  metoprolol tartrate 25 milliGRAM(s) Oral two times a day  polyethylene glycol 3350 17 Gram(s) Oral two times a day  senna 2 Tablet(s) Oral at bedtime  tamsulosin 0.4 milliGRAM(s) Oral at bedtime  tiZANidine 2 milliGRAM(s) Oral at bedtime    MEDICATIONS  (PRN):  acetaminophen     Tablet .. 650 milliGRAM(s) Oral every 6 hours PRN Temp greater or equal to 38C (100.4F), Mild Pain (1 - 3)  dextrose Oral Gel 15 Gram(s) Oral once PRN Blood Glucose LESS THAN 70 milliGRAM(s)/deciliter  oxyCODONE    IR 5 milliGRAM(s) Oral every 6 hours PRN Moderate Pain (4 - 6)    Vital Signs Last 24 Hrs  T(F): 97.9 (03 Feb 2023 05:23), Max: 98.5 (02 Feb 2023 20:24)  HR: 92 (03 Feb 2023 05:23) (80 - 92)  BP: 130/75 (03 Feb 2023 05:23) (130/75 - 132/74)  RR: 16 (03 Feb 2023 05:23) (16 - 16)  SpO2: 98% (03 Feb 2023 05:23) (97% - 98%)  I&O's Summary    PHYSICAL EXAM:  GENERAL: NAD  HENT:  Atraumatic, Normocephalic; No tonsillar erythema, exudates, or enlargement; Moist mucous membranes;   EYES: EOMI, PERRLA, conjunctiva and sclera clear, no lid-lag  NECK: Supple, No JVD, Normal thyroid  CHEST/LUNG: Clear to percussion bilaterally; No rales, rhonchi, wheezing, or rubs; normal respiratory effort, no intercostal retractions  HEART: Regular rate and rhythm; No murmurs, rubs, or gallops; No pitting edema  ABDOMEN: Soft, Nontender, Nondistended; Bowel sounds present; No HSM  MUSCULOSKELETAL/EXTREMITIES:  2+ Peripheral Pulses, No clubbing or digital cyanosis  PSYCH: Appropriate affect, Alert & Awake; Good judgement    LABS:                        10.0   7.22  )-----------( 341      ( 02 Feb 2023 07:30 )             31.4       02-02    140  |  102  |  11  ----------------------------<  155  3.5   |  29  |  0.84    Ca    8.7      02 Feb 2023 07:30    TPro  6.5  /  Alb  2.8  /  TBili  0.8  /  DBili  x   /  AST  70  /  ALT  79  /  AlkPhos  144  02-02     POCT Blood Glucose.: 187 mg/dL (03 Feb 2023 08:04)  POCT Blood Glucose.: 200 mg/dL (02 Feb 2023 16:19)    COVID-19 PCR: NotDetec (01-27-23 @ 19:06)    Care Discussed with Rehab Attending and Other Providers

## 2023-02-03 NOTE — PROGRESS NOTE ADULT - ASSESSMENT
86 YO male with PMH of HTN, HLD,  Nonobstructive CAD, prostate cancer s/p RT,  DM II, Arthritis s/p Right total knee replacement in 2008, squamous cell carcinoma of scalp, BPH, ASHD, Chronic lower back pain secondary to Lumbar stenosis with neurogenic claudication, COVID 2020 who presented to [facility] on 1/24 with c/o  lower back pain radiating down to B/L legs that has  progressively worsened and has failed conservation treatments.    MRI of lumbar spine revealed  Convex left curvature with straightening of the lordosis, multilevel loss of disc signal and height and anterior spurring and bulging from L2-L3 through L5-S1. He is s/p right posterior spine  L3-L5 extreme lateral lumbar interbody fusion, L2-S1 posterior column osteotomy, facetectomy, foraminotomy, L3-L5 posterior instrumented fusion  paraspineous muscle flap wound closure on 1/24.  Patient now with gait Instability, ADL impairments and Functional impairments.    * Pain management- warm/cold compress- Lidocaine patch added * Tizanidine 2 mg @ HS * Continue to monitor functional progress * Monitor bowel pattern *     #Lumbar Radiculopathy  - MRI of lumbar spine revealed  Convex left curvature with straightening of the lordosis, multilevel loss of disc signal and height and anterior spurring and bulging from L2-L3 through L5-S1.   - now s/p   L3-L5 extreme lateral lumbar interbody fusion, L2-S1 posterior column osteotomy, facetectomy, foraminotomy, L3-L5 posterior instrumented fusion  paraspineous muscle flap wound closure on 1/24.  - Surgical site with dermabond- well approximated  - Start Comprehensive Rehab Program: PT/OT 3hours daily and 5 days weekly  - PT: Focused on improving strength, endurance, coordination, balance, functional mobility, and transfers  - OT: Focused on improving strength, fine motor skills, coordination, posture and ADLs.      #HLD  - Lipitor 80mg daily    #HTN  - Lisinopril  10mg daily  - Metoprolol 25mg BID    #DM II  - ISS and FS  - Admelog and Lantus    #BPH  - Flomax 0.4mg daily    #Pain management  - Tylenol PRN, Oxycodone PRN, Zanaflex 2mg prn  - Warm or cold compress  - Tizanidine 2 mg @ HS   - Lidocaine patch daily     #DVT ppx  -  Heparin, SCD, TEDs    #GI ppx  - Pepcid 20mg    #Bowel Regimen  - Senna  - Miralax BID 2/1  - Fleet enema x1 dose 2/1- with successful BM   - Bowel regimen adjustment per hospitalist 2/2     #Bladder management  - BS on admission, and q 8 hours (SC if > 400)  - Monitor UO    #FEN   - Diet: Regular. Cardiac  - Supplements vit D, calcium    #Skin:  - Skin on admission: Thoracic spine incision + 2 drain site with surgiglue, Right flank incision with surgiglue BRANDI  - Pressure injury/Skin: Turn Q2hrs while in bed, OOB to Chair, PT/OT     #Sleep:   - Maintain quiet hours and low stim environment.  - Melatonin Dcd on 2/2  -- Tizanidine 2 mg @ HS    #Precaution  - Fall, Aspiration, cardiac, thoracic spine    IDT conference on 2/2  TDD: 2/10 to home  Barriers: Pain  Social Work: Lives in  with 3STE with 1st FL setup. Independent PTA.  OT: Min A/CGA for ADLs and transfers. Mod A for shower transfer.  PT: Min A with RW for transfers. Ambulated 50 ft with RW with min A. Negotiated 8 (6-inch) stairs with 2 HRs.   SLP: --   ----------------------------------------------------------------  Outpatient Follow-up (Specialty/Name of physician):  PCP: Dr. Tamela Mckeon MD  Plastic Surgery  [address] [place] [address]  [address]  [place]. [id number] [phone]    Leo Smith MD  Neurosurgery  [address] [place] [address]  [address]  [place]. [address]  [phone]  ----------------------------------------------------------------

## 2023-02-04 DIAGNOSIS — E78.5 HYPERLIPIDEMIA, UNSPECIFIED: ICD-10-CM

## 2023-02-04 DIAGNOSIS — E11.9 TYPE 2 DIABETES MELLITUS WITHOUT COMPLICATIONS: ICD-10-CM

## 2023-02-04 DIAGNOSIS — R74.01 ELEVATION OF LEVELS OF LIVER TRANSAMINASE LEVELS: ICD-10-CM

## 2023-02-04 DIAGNOSIS — M54.16 RADICULOPATHY, LUMBAR REGION: ICD-10-CM

## 2023-02-04 DIAGNOSIS — I10 ESSENTIAL (PRIMARY) HYPERTENSION: ICD-10-CM

## 2023-02-04 LAB
GLUCOSE BLDC GLUCOMTR-MCNC: 155 MG/DL — HIGH (ref 70–99)
GLUCOSE BLDC GLUCOMTR-MCNC: 169 MG/DL — HIGH (ref 70–99)

## 2023-02-04 PROCEDURE — 99232 SBSQ HOSP IP/OBS MODERATE 35: CPT

## 2023-02-04 RX ADMIN — Medication 25 MILLIGRAM(S): at 05:05

## 2023-02-04 RX ADMIN — LIDOCAINE 1 PATCH: 4 CREAM TOPICAL at 06:27

## 2023-02-04 RX ADMIN — LISINOPRIL 10 MILLIGRAM(S): 2.5 TABLET ORAL at 05:04

## 2023-02-04 RX ADMIN — METFORMIN HYDROCHLORIDE 1000 MILLIGRAM(S): 850 TABLET ORAL at 17:43

## 2023-02-04 RX ADMIN — LIDOCAINE 1 PATCH: 4 CREAM TOPICAL at 05:03

## 2023-02-04 RX ADMIN — LIDOCAINE 1 PATCH: 4 CREAM TOPICAL at 19:00

## 2023-02-04 RX ADMIN — Medication 2: at 08:27

## 2023-02-04 RX ADMIN — FAMOTIDINE 20 MILLIGRAM(S): 10 INJECTION INTRAVENOUS at 11:42

## 2023-02-04 RX ADMIN — Medication 25 MILLIGRAM(S): at 17:43

## 2023-02-04 RX ADMIN — ATORVASTATIN CALCIUM 80 MILLIGRAM(S): 80 TABLET, FILM COATED ORAL at 21:47

## 2023-02-04 RX ADMIN — Medication 650 MILLIGRAM(S): at 04:03

## 2023-02-04 RX ADMIN — OXYCODONE HYDROCHLORIDE 5 MILLIGRAM(S): 5 TABLET ORAL at 22:47

## 2023-02-04 RX ADMIN — HEPARIN SODIUM 5000 UNIT(S): 5000 INJECTION INTRAVENOUS; SUBCUTANEOUS at 17:42

## 2023-02-04 RX ADMIN — TAMSULOSIN HYDROCHLORIDE 0.4 MILLIGRAM(S): 0.4 CAPSULE ORAL at 21:47

## 2023-02-04 RX ADMIN — Medication 650 MILLIGRAM(S): at 05:03

## 2023-02-04 RX ADMIN — METFORMIN HYDROCHLORIDE 1000 MILLIGRAM(S): 850 TABLET ORAL at 08:26

## 2023-02-04 RX ADMIN — Medication 2: at 17:43

## 2023-02-04 RX ADMIN — HEPARIN SODIUM 5000 UNIT(S): 5000 INJECTION INTRAVENOUS; SUBCUTANEOUS at 05:05

## 2023-02-04 RX ADMIN — TIZANIDINE 2 MILLIGRAM(S): 4 TABLET ORAL at 21:47

## 2023-02-04 RX ADMIN — OXYCODONE HYDROCHLORIDE 5 MILLIGRAM(S): 5 TABLET ORAL at 21:47

## 2023-02-04 NOTE — PROGRESS NOTE ADULT - ASSESSMENT
This is an 86 y/o M with PMH of HTN, HLD, Nonobstructive CAD, prostate cancer s/p RT, T2DM, Arthritis s/p Right total knee replacement in 2008, squamous cell carcinoma of scalp, BPH, ASHD, Chronic lower back pain secondary to Lumbar stenosis with neurogenic claudication, COVID 2020 who presented to [facility] on 1/24 with c/o lower back pain radiating down to B/L legs that has progressively worsened and has failed conservation treatments. MRI of lumbar spine revealed  Convex left curvature with straightening of the lordosis, multilevel loss of disc signal and height and anterior spurring and bulging from L2-L3 through L5-S1. He is s/p right posterior spine L3-L5 extreme lateral lumbar interbody fusion, L2-S1 posterior column osteotomy, facetectomy, foraminotomy, L3-L5 posterior instrumented fusion paraspinous muscle flap wound closure on 1/24. Patient now with gait Instability, ADL impairments and Functional impairments.    #Lumbar Radiculopathy  -MRI of lumbar spine revealed Convex left curvature with straightening of the lordosis, multilevel loss of disc signal and height and anterior spurring and bulging from L2-L3 through L5-S1  -Now s/p L3-L5 extreme lateral lumbar interbody fusion, L2-S1 posterior column osteotomy, facetectomy, foraminotomy, L3-L5 posterior instrumented fusion paraspinous muscle flap wound closure on 1/24  -continue comprehensive rehab program   -Pain management, bowel regimen per rehab     # Anemia  - no active s/s bleeding, likely post op anemia   - continue to monitor    #HLD  -Continue Lipitor     #HTN  -Continue Lisinopril and Metoprolol    #DM II and hyperglycemia  - A1c 8.5  -ISS and FS  -D/C'd lantus. increased metformin 1000 mg BID    #BPH  -Continue Flomax     #Transaminitis  -Could be due to tizanidine  -Trend    #DVT ppx - HSQ  #GI ppx - Pepcid

## 2023-02-04 NOTE — PROGRESS NOTE ADULT - ASSESSMENT
From primary team:  86 YO male with PMH of HTN, HLD,  Nonobstructive CAD, prostate cancer s/p RT,  DM II, Arthritis s/p Right total knee replacement in 2008, squamous cell carcinoma of scalp, BPH, ASHD, Chronic lower back pain secondary to Lumbar stenosis with neurogenic claudication, COVID 2020 who presented to [facility] on 1/24 with c/o  lower back pain radiating down to B/L legs that has  progressively worsened and has failed conservation treatments.    MRI of lumbar spine revealed  Convex left curvature with straightening of the lordosis, multilevel loss of disc signal and height and anterior spurring and bulging from L2-L3 through L5-S1. He is s/p right posterior spine  L3-L5 extreme lateral lumbar interbody fusion, L2-S1 posterior column osteotomy, facetectomy, foraminotomy, L3-L5 posterior instrumented fusion  paraspineous muscle flap wound closure on 1/24.  Patient now with gait Instability, ADL impairments and Functional impairments.    * Pain management- warm/cold compress- Lidocaine patch added * Tizanidine 2 mg @ HS * Continue to monitor functional progress * Monitor bowel pattern *     #Lumbar Radiculopathy  - MRI of lumbar spine revealed  Convex left curvature with straightening of the lordosis, multilevel loss of disc signal and height and anterior spurring and bulging from L2-L3 through L5-S1.   - now s/p   L3-L5 extreme lateral lumbar interbody fusion, L2-S1 posterior column osteotomy, facetectomy, foraminotomy, L3-L5 posterior instrumented fusion  paraspineous muscle flap wound closure on 1/24.  - Surgical site with dermabond- well approximated  - Start Comprehensive Rehab Program: PT/OT 3hours daily and 5 days weekly  - PT: Focused on improving strength, endurance, coordination, balance, functional mobility, and transfers  - OT: Focused on improving strength, fine motor skills, coordination, posture and ADLs.      #HLD  - Lipitor 80mg daily    #HTN  - Lisinopril  10mg daily  - Metoprolol 25mg BID    #DM II  - ISS and FS  - Admelog and Lantus    #BPH  - Flomax 0.4mg daily    #Pain management  - Tylenol PRN, Oxycodone PRN, Zanaflex 2mg prn  - Warm or cold compress  - Tizanidine 2 mg @ HS   - Lidocaine patch daily     #DVT ppx  -  Heparin, SCD, TEDs    #GI ppx  - Pepcid 20mg    #Bowel Regimen  - Senna  - Miralax BID 2/1  - Fleet enema x1 dose 2/1- with successful BM   - Bowel regimen adjustment per hospitalist 2/2     #Bladder management  - BS on admission, and q 8 hours (SC if > 400)  - Monitor UO    #FEN   - Diet: Regular. Cardiac  - Supplements vit D, calcium    #Skin:  - Skin on admission: Thoracic spine incision + 2 drain site with surgiglue, Right flank incision with surgiglue BRANDI  - Pressure injury/Skin: Turn Q2hrs while in bed, OOB to Chair, PT/OT     #Sleep:   - Maintain quiet hours and low stim environment.  - Melatonin Dcd on 2/2  -- Tizanidine 2 mg @ HS    #Precaution  - Fall, Aspiration, cardiac, thoracic spine    IDT conference on 2/2  TDD: 2/10 to home  Barriers: Pain  Social Work: Lives in  with 3STE with 1st FL setup. Independent PTA.  OT: Min A/CGA for ADLs and transfers. Mod A for shower transfer.  PT: Min A with RW for transfers. Ambulated 50 ft with RW with min A. Negotiated 8 (6-inch) stairs with 2 HRs.   SLP: --   ----------------------------------------------------------------  Outpatient Follow-up (Specialty/Name of physician):  PCP: Dr. Tamela Mckeon MD  Plastic Surgery  [address] [place] [address]  [address]  [place] [id number] [phone]    Leo Smith MD  Neurosurgery  [address] [place] [address]  [address]  [place]. [address]  [phone]  ----------------------------------------------------------------

## 2023-02-04 NOTE — PROGRESS NOTE ADULT - SUBJECTIVE AND OBJECTIVE BOX
Patient is a 87y old  Male who presents with a chief complaint of Lumbar Radiculopathy (03 Feb 2023 09:37)      History, interim events and clinically pertinent issues reviewed; patient interviewed and examined.  He has no complaints this AM and there were no acute medical events overnight.  Denies back pain at present; appetite and sleep quality are good  REVIEW OF SYMPTOMS: patient denies HA's, CP, palpitations, shortness of breath or upper respiratory symptoms, nausea, vomiting, diarrhea, constipation, dysuria, bruising/bleeding and all other systems were reviewed as negative        ALLERGIES:  clindamycin (Unknown)  gentamicin (Unknown)    MEDICATIONS  (STANDING):  atorvastatin 80 milliGRAM(s) Oral at bedtime  cholecalciferol 2000 Unit(s) Oral <User Schedule>  dextrose 5%. 1000 milliLiter(s) (50 mL/Hr) IV Continuous <Continuous>  dextrose 5%. 1000 milliLiter(s) (100 mL/Hr) IV Continuous <Continuous>  dextrose 50% Injectable 25 Gram(s) IV Push once  dextrose 50% Injectable 12.5 Gram(s) IV Push once  dextrose 50% Injectable 25 Gram(s) IV Push once  famotidine    Tablet 20 milliGRAM(s) Oral daily  glucagon  Injectable 1 milliGRAM(s) IntraMuscular once  heparin   Injectable 5000 Unit(s) SubCutaneous every 12 hours  insulin lispro (ADMELOG) corrective regimen sliding scale   SubCutaneous two times a day before meals  lactulose Syrup 20 Gram(s) Oral daily  lidocaine   4% Patch 1 Patch Transdermal <User Schedule>  lisinopril 10 milliGRAM(s) Oral daily  metFORMIN 1000 milliGRAM(s) Oral two times a day with meals  metoprolol tartrate 25 milliGRAM(s) Oral two times a day  polyethylene glycol 3350 17 Gram(s) Oral two times a day  senna 2 Tablet(s) Oral at bedtime  tamsulosin 0.4 milliGRAM(s) Oral at bedtime  tiZANidine 2 milliGRAM(s) Oral at bedtime    MEDICATIONS  (PRN):  acetaminophen     Tablet .. 650 milliGRAM(s) Oral every 6 hours PRN Temp greater or equal to 38C (100.4F), Mild Pain (1 - 3)  dextrose Oral Gel 15 Gram(s) Oral once PRN Blood Glucose LESS THAN 70 milliGRAM(s)/deciliter  oxyCODONE    IR 5 milliGRAM(s) Oral every 6 hours PRN Moderate Pain (4 - 6)    Vital Signs Last 24 Hrs  T(F): 97.7 (04 Feb 2023 08:56), Max: 98.8 (03 Feb 2023 20:55)  HR: 85 (04 Feb 2023 08:56) (80 - 104)  BP: 144/80 (04 Feb 2023 08:56) (105/65 - 144/80)  RR: 16 (04 Feb 2023 08:56) (16 - 16)  SpO2: 98% (04 Feb 2023 08:56) (94% - 98%)  I&O's Summary      POCT Blood Glucose.: 155 mg/dL (04 Feb 2023 08:22)  POCT Blood Glucose.: 153 mg/dL (03 Feb 2023 17:03)  POCT Blood Glucose.: 174 mg/dL (03 Feb 2023 12:18)    PHYSICAL EXAM:  General: NAD, A/O x 3  ENT: MMM  Neck: Supple, No JVD  Lungs: Clear to auscultation bilaterally  Cardio: RRR, S1/S2, No murmurs  Abdomen: Soft, Nontender, Nondistended; Bowel sounds present  Extremities: No calf tenderness, No pitting edema      LABS:                        10.0   7.22  )-----------( 341      ( 02 Feb 2023 07:30 )             31.4       02-02    140  |  102  |  11  ----------------------------<  155  3.5   |  29  |  0.84    Ca    8.7      02 Feb 2023 07:30    TPro  6.5  /  Alb  2.8  /  TBili  0.8  /  DBili  x   /  AST  70  /  ALT  79  /  AlkPhos  144  02-02                                COVID-19 PCR: [place] (01-27-23 @ 19:06)

## 2023-02-04 NOTE — PROGRESS NOTE ADULT - SUBJECTIVE AND OBJECTIVE BOX
Subjective: Seen at bedside in AM. Patient with no new medical issues today.  Pain controlled, no chest pain, no N/V, no Fevers/Chills. No other new ROS  Has been tolerating rehabilitation program.    acetaminophen     Tablet .. 650 milliGRAM(s) Oral every 6 hours PRN  atorvastatin 80 milliGRAM(s) Oral at bedtime  cholecalciferol 2000 Unit(s) Oral <User Schedule>  dextrose 5%. 1000 milliLiter(s) IV Continuous <Continuous>  dextrose 5%. 1000 milliLiter(s) IV Continuous <Continuous>  dextrose 50% Injectable 25 Gram(s) IV Push once  dextrose 50% Injectable 12.5 Gram(s) IV Push once  dextrose 50% Injectable 25 Gram(s) IV Push once  dextrose Oral Gel 15 Gram(s) Oral once PRN  famotidine    Tablet 20 milliGRAM(s) Oral daily  glucagon  Injectable 1 milliGRAM(s) IntraMuscular once  heparin   Injectable 5000 Unit(s) SubCutaneous every 12 hours  insulin lispro (ADMELOG) corrective regimen sliding scale   SubCutaneous two times a day before meals  lactulose Syrup 20 Gram(s) Oral daily  lidocaine   4% Patch 1 Patch Transdermal <User Schedule>  lisinopril 10 milliGRAM(s) Oral daily  metFORMIN 1000 milliGRAM(s) Oral two times a day with meals  metoprolol tartrate 25 milliGRAM(s) Oral two times a day  oxyCODONE    IR 5 milliGRAM(s) Oral every 6 hours PRN  polyethylene glycol 3350 17 Gram(s) Oral two times a day  senna 2 Tablet(s) Oral at bedtime  tamsulosin 0.4 milliGRAM(s) Oral at bedtime  tiZANidine 2 milliGRAM(s) Oral at bedtime      T(C): 36.5 (02-04-23 @ 08:56), Max: 37.1 (02-03-23 @ 20:55)  HR: 85 (02-04-23 @ 08:56) (85 - 104)  BP: 144/80 (02-04-23 @ 08:56) (105/65 - 144/80)  RR: 16 (02-04-23 @ 08:56) (16 - 16)  SpO2: 98% (02-04-23 @ 08:56) (95% - 98%)    In NAD  HEENT- EOMI  Heart- Well Perfused  Lungs- No resp distress, no use of accessory resp muscles  Abd- soft, NT  Ext- No calf pain  Neuro- Exam unchanged  Psych- Affect wnl          Imp: Patient with diagnosis of          admitted for comprehensive acute rehabilitation.    Plan:  - Continue therapies  - DVT prophylaxis  - Skin- Turn q2h, check skin daily  - Continue current medications; patient medically stable.   - Patient is stable to continue current rehabilitation program.

## 2023-02-05 LAB
GLUCOSE BLDC GLUCOMTR-MCNC: 148 MG/DL — HIGH (ref 70–99)
GLUCOSE BLDC GLUCOMTR-MCNC: 169 MG/DL — HIGH (ref 70–99)

## 2023-02-05 PROCEDURE — 99232 SBSQ HOSP IP/OBS MODERATE 35: CPT

## 2023-02-05 RX ADMIN — LIDOCAINE 1 PATCH: 4 CREAM TOPICAL at 06:50

## 2023-02-05 RX ADMIN — OXYCODONE HYDROCHLORIDE 5 MILLIGRAM(S): 5 TABLET ORAL at 08:21

## 2023-02-05 RX ADMIN — LISINOPRIL 10 MILLIGRAM(S): 2.5 TABLET ORAL at 05:22

## 2023-02-05 RX ADMIN — TIZANIDINE 2 MILLIGRAM(S): 4 TABLET ORAL at 22:39

## 2023-02-05 RX ADMIN — METFORMIN HYDROCHLORIDE 1000 MILLIGRAM(S): 850 TABLET ORAL at 17:37

## 2023-02-05 RX ADMIN — HEPARIN SODIUM 5000 UNIT(S): 5000 INJECTION INTRAVENOUS; SUBCUTANEOUS at 05:21

## 2023-02-05 RX ADMIN — Medication 25 MILLIGRAM(S): at 17:37

## 2023-02-05 RX ADMIN — HEPARIN SODIUM 5000 UNIT(S): 5000 INJECTION INTRAVENOUS; SUBCUTANEOUS at 17:37

## 2023-02-05 RX ADMIN — Medication 2: at 17:37

## 2023-02-05 RX ADMIN — TAMSULOSIN HYDROCHLORIDE 0.4 MILLIGRAM(S): 0.4 CAPSULE ORAL at 22:39

## 2023-02-05 RX ADMIN — OXYCODONE HYDROCHLORIDE 5 MILLIGRAM(S): 5 TABLET ORAL at 22:38

## 2023-02-05 RX ADMIN — OXYCODONE HYDROCHLORIDE 5 MILLIGRAM(S): 5 TABLET ORAL at 09:15

## 2023-02-05 RX ADMIN — LIDOCAINE 1 PATCH: 4 CREAM TOPICAL at 17:41

## 2023-02-05 RX ADMIN — Medication 25 MILLIGRAM(S): at 05:21

## 2023-02-05 RX ADMIN — OXYCODONE HYDROCHLORIDE 5 MILLIGRAM(S): 5 TABLET ORAL at 23:33

## 2023-02-05 RX ADMIN — FAMOTIDINE 20 MILLIGRAM(S): 10 INJECTION INTRAVENOUS at 12:29

## 2023-02-05 RX ADMIN — METFORMIN HYDROCHLORIDE 1000 MILLIGRAM(S): 850 TABLET ORAL at 08:21

## 2023-02-05 RX ADMIN — ATORVASTATIN CALCIUM 80 MILLIGRAM(S): 80 TABLET, FILM COATED ORAL at 22:39

## 2023-02-05 RX ADMIN — LIDOCAINE 1 PATCH: 4 CREAM TOPICAL at 05:22

## 2023-02-05 NOTE — PROGRESS NOTE ADULT - NSPROGADDITIONALINFOA_GEN_ALL_CORE
I have personally interviewed and examined this patient, reviewed pertinent clinical information, and performed the evaluation and management services provided at today's visit for inpatient medical follow up    I am available to discuss any issues related to the medical care of this patient on the unit, or by phone at [phone]
I have personally interviewed and examined this patient, reviewed pertinent clinical information, and performed the evaluation and management services provided at today's visit for inpatient medical follow up    I am available to discuss any issues related to the medical care of this patient on the unit, or by phone at [phone]

## 2023-02-05 NOTE — PROGRESS NOTE ADULT - SUBJECTIVE AND OBJECTIVE BOX
Patient is a 87y old  Male who presents with a chief complaint of Lumbar Radiculopathy (04 Feb 2023 12:25)      Patient examined and interviewed. There were no acute medical events yesterday or overnight and patient is without complaints this morning.  His back pain seems controlled on meds and he's moving his bowels and voiding  REVIEW OF SYMPTOMS: patient denies HA's, CP, palpitations, shortness of breath or upper respiratory symptoms, nausea, vomiting, diarrhea, constipation, dysuria, bruising/bleeding and all other systems were reviewed as negative        ALLERGIES:  clindamycin (Unknown)  gentamicin (Unknown)    MEDICATIONS  (STANDING):  atorvastatin 80 milliGRAM(s) Oral at bedtime  cholecalciferol 2000 Unit(s) Oral <User Schedule>  dextrose 5%. 1000 milliLiter(s) (50 mL/Hr) IV Continuous <Continuous>  dextrose 5%. 1000 milliLiter(s) (100 mL/Hr) IV Continuous <Continuous>  dextrose 50% Injectable 25 Gram(s) IV Push once  dextrose 50% Injectable 12.5 Gram(s) IV Push once  dextrose 50% Injectable 25 Gram(s) IV Push once  famotidine    Tablet 20 milliGRAM(s) Oral daily  glucagon  Injectable 1 milliGRAM(s) IntraMuscular once  heparin   Injectable 5000 Unit(s) SubCutaneous every 12 hours  insulin lispro (ADMELOG) corrective regimen sliding scale   SubCutaneous two times a day before meals  lactulose Syrup 20 Gram(s) Oral daily  lidocaine   4% Patch 1 Patch Transdermal <User Schedule>  lisinopril 10 milliGRAM(s) Oral daily  metFORMIN 1000 milliGRAM(s) Oral two times a day with meals  metoprolol tartrate 25 milliGRAM(s) Oral two times a day  polyethylene glycol 3350 17 Gram(s) Oral two times a day  senna 2 Tablet(s) Oral at bedtime  tamsulosin 0.4 milliGRAM(s) Oral at bedtime  tiZANidine 2 milliGRAM(s) Oral at bedtime    MEDICATIONS  (PRN):  acetaminophen     Tablet .. 650 milliGRAM(s) Oral every 6 hours PRN Temp greater or equal to 38C (100.4F), Mild Pain (1 - 3)  dextrose Oral Gel 15 Gram(s) Oral once PRN Blood Glucose LESS THAN 70 milliGRAM(s)/deciliter  oxyCODONE    IR 5 milliGRAM(s) Oral every 6 hours PRN Moderate Pain (4 - 6)    Vital Signs Last 24 Hrs  T(F): 97.3 (05 Feb 2023 08:22), Max: 98.5 (04 Feb 2023 20:19)  HR: 96 (05 Feb 2023 08:22) (85 - 96)  BP: 121/74 (05 Feb 2023 08:22) (121/74 - 144/80)  RR: 16 (05 Feb 2023 08:22) (16 - 16)  SpO2: 95% (05 Feb 2023 08:22) (95% - 98%)  I&O's Summary            POCT Blood Glucose.: 148 mg/dL (05 Feb 2023 08:17)  POCT Blood Glucose.: 169 mg/dL (04 Feb 2023 16:57)    PHYSICAL EXAM:  General: NAD, A/O x 3  ENT: MMM  Neck: Supple, No JVD  Lungs: Clear to auscultation bilaterally  Cardio: RRR, S1/S2, No murmurs  Abdomen: Soft, Nontender, Nondistended; Bowel sounds present  Extremities: No calf tenderness, No pitting edema      LABS: There are no new laboratory or radiologic studies resulted at the time this progress note was authored                                          COVID-19 PCR: [place] (01-27-23 @ 19:06)

## 2023-02-05 NOTE — PROGRESS NOTE ADULT - ASSESSMENT
From primary team:  86 YO male with PMH of HTN, HLD,  Nonobstructive CAD, prostate cancer s/p RT,  DM II, Arthritis s/p Right total knee replacement in 2008, squamous cell carcinoma of scalp, BPH, ASHD, Chronic lower back pain secondary to Lumbar stenosis with neurogenic claudication, COVID 2020 who presented to [facility] on 1/24 with c/o  lower back pain radiating down to B/L legs that has  progressively worsened and has failed conservation treatments.    MRI of lumbar spine revealed  Convex left curvature with straightening of the lordosis, multilevel loss of disc signal and height and anterior spurring and bulging from L2-L3 through L5-S1. He is s/p right posterior spine  L3-L5 extreme lateral lumbar interbody fusion, L2-S1 posterior column osteotomy, facetectomy, foraminotomy, L3-L5 posterior instrumented fusion  paraspineous muscle flap wound closure on 1/24.  Patient now with gait Instability, ADL impairments and Functional impairments.    * Pain management- warm/cold compress- Lidocaine patch added * Tizanidine 2 mg @ HS * Continue to monitor functional progress * Monitor bowel pattern *     #Lumbar Radiculopathy  - MRI of lumbar spine revealed  Convex left curvature with straightening of the lordosis, multilevel loss of disc signal and height and anterior spurring and bulging from L2-L3 through L5-S1.   - now s/p   L3-L5 extreme lateral lumbar interbody fusion, L2-S1 posterior column osteotomy, facetectomy, foraminotomy, L3-L5 posterior instrumented fusion  paraspineous muscle flap wound closure on 1/24.  - Surgical site with dermabond- well approximated  - Start Comprehensive Rehab Program: PT/OT 3hours daily and 5 days weekly  - PT: Focused on improving strength, endurance, coordination, balance, functional mobility, and transfers  - OT: Focused on improving strength, fine motor skills, coordination, posture and ADLs.      #HLD  - Lipitor 80mg daily    #HTN  - Lisinopril  10mg daily  - Metoprolol 25mg BID    #DM II  - ISS and FS  - Admelog and Lantus    #BPH  - Flomax 0.4mg daily    #Pain management  - Tylenol PRN, Oxycodone PRN, Zanaflex 2mg prn  - Warm or cold compress  - Tizanidine 2 mg @ HS   - Lidocaine patch daily     #DVT ppx  -  Heparin, SCD, TEDs    #GI ppx  - Pepcid 20mg    #Bowel Regimen  - Senna  - Miralax BID 2/1  - Fleet enema x1 dose 2/1- with successful BM   - Bowel regimen adjustment per hospitalist 2/2     #Bladder management  - BS on admission, and q 8 hours (SC if > 400)  - Monitor UO    #FEN   - Diet: Regular. Cardiac  - Supplements vit D, calcium    #Skin:  - Skin on admission: Thoracic spine incision + 2 drain site with surgiglue, Right flank incision with surgiglue BRANDI  - Pressure injury/Skin: Turn Q2hrs while in bed, OOB to Chair, PT/OT     #Sleep:   - Maintain quiet hours and low stim environment.  - Melatonin Dcd on 2/2  -- Tizanidine 2 mg @ HS    #Precaution  - Fall, Aspiration, cardiac, thoracic spine    IDT conference on 2/2  TDD: 2/10 to home  Barriers: Pain  Social Work: Lives in  with 3STE with 1st FL setup. Independent PTA.  OT: Min A/CGA for ADLs and transfers. Mod A for shower transfer.  PT: Min A with RW for transfers. Ambulated 50 ft with RW with min A. Negotiated 8 (6-inch) stairs with 2 HRs.   SLP: --   ----------------------------------------------------------------  Outpatient Follow-up (Specialty/Name of physician):  PCP: Dr. Tamela Mckeon MD  Plastic Surgery  [address] [place] [address]  [address]  [place] [id number] [phone]    Leo Smith MD  Neurosurgery  [address] [place] [address]  [address]  [place]. [address]  [phone]  ----------------------------------------------------------------

## 2023-02-05 NOTE — PROGRESS NOTE ADULT - SUBJECTIVE AND OBJECTIVE BOX
Subjective: Seen at bedside in AM.   Discussed in team rounds.  Patient with no new medical issues today.  Pain controlled, no chest pain, no N/V, no Fevers/Chills. No other new ROS  Has been tolerating rehabilitation program.    acetaminophen     Tablet .. 650 milliGRAM(s) Oral every 6 hours PRN  atorvastatin 80 milliGRAM(s) Oral at bedtime  cholecalciferol 2000 Unit(s) Oral <User Schedule>  dextrose 5%. 1000 milliLiter(s) IV Continuous <Continuous>  dextrose 5%. 1000 milliLiter(s) IV Continuous <Continuous>  dextrose 50% Injectable 25 Gram(s) IV Push once  dextrose 50% Injectable 12.5 Gram(s) IV Push once  dextrose 50% Injectable 25 Gram(s) IV Push once  dextrose Oral Gel 15 Gram(s) Oral once PRN  famotidine    Tablet 20 milliGRAM(s) Oral daily  glucagon  Injectable 1 milliGRAM(s) IntraMuscular once  heparin   Injectable 5000 Unit(s) SubCutaneous every 12 hours  insulin lispro (ADMELOG) corrective regimen sliding scale   SubCutaneous two times a day before meals  lactulose Syrup 20 Gram(s) Oral daily  lidocaine   4% Patch 1 Patch Transdermal <User Schedule>  lisinopril 10 milliGRAM(s) Oral daily  metFORMIN 1000 milliGRAM(s) Oral two times a day with meals  metoprolol tartrate 25 milliGRAM(s) Oral two times a day  oxyCODONE    IR 5 milliGRAM(s) Oral every 6 hours PRN  polyethylene glycol 3350 17 Gram(s) Oral two times a day  senna 2 Tablet(s) Oral at bedtime  tamsulosin 0.4 milliGRAM(s) Oral at bedtime  tiZANidine 2 milliGRAM(s) Oral at bedtime      T(C): 36.5 (02-04-23 @ 08:56), Max: 37.1 (02-03-23 @ 20:55)  HR: 85 (02-04-23 @ 08:56) (85 - 104)  BP: 144/80 (02-04-23 @ 08:56) (105/65 - 144/80)  RR: 16 (02-04-23 @ 08:56) (16 - 16)  SpO2: 98% (02-04-23 @ 08:56) (95% - 98%)    In NAD  HEENT- EOMI  Heart- Well Perfused  Lungs- No resp distress, no use of accessory resp muscles  Abd- soft, NT  Ext- No calf pain  Neuro- Exam unchanged  Psych- Affect wnl          Imp: Patient with diagnosis of          admitted for comprehensive acute rehabilitation.    Plan:  - Continue therapies  - DVT prophylaxis  - Skin- Turn q2h, check skin daily  - Continue current medications; patient medically stable.   - Patient is stable to continue current rehabilitation program.

## 2023-02-05 NOTE — PROGRESS NOTE ADULT - ASSESSMENT
This is an 86 y/o M with PMH of HTN, HLD, Nonobstructive CAD, prostate cancer s/p RT, T2DM, Arthritis s/p Right total knee replacement in 2008, squamous cell carcinoma of scalp, BPH, ASHD, Chronic lower back pain secondary to Lumbar stenosis with neurogenic claudication, COVID 2020 who presented to [facility] on 1/24 with c/o lower back pain radiating down to B/L legs that has progressively worsened and has failed conservation treatments. MRI of lumbar spine revealed  Convex left curvature with straightening of the lordosis, multilevel loss of disc signal and height and anterior spurring and bulging from L2-L3 through L5-S1. He is s/p right posterior spine L3-L5 extreme lateral lumbar interbody fusion, L2-S1 posterior column osteotomy, facetectomy, foraminotomy, L3-L5 posterior instrumented fusion paraspinous muscle flap wound closure on 1/24. Patient now with gait Instability, ADL impairments and Functional impairments.    #Lumbar Radiculopathy s/p L3-L5 extreme lateral lumbar interbody fusion, L2-S1 posterior column osteotomy, facetectomy, foraminotomy, L3-L5 posterior instrumented fusion paraspinous muscle flap wound closure on 1/24  -continue comprehensive rehab program   -Pain management, bowel regimen per rehab     # Anemia  - no active s/s bleeding, likely post op anemia   - continue to monitor    #HLD  -Continue Lipitor     #HTN  -Continue Lisinopril and Metoprolol    #DM II and hyperglycemia  - A1c 8.5  -ISS and FS  -D/C'd lantus. increased metformin 1000 mg BID    #BPH  -Continue Flomax     #Transaminitis  -Could be due to tizanidine  -Trend    #DVT ppx - HSQ  #GI ppx - Pepcid

## 2023-02-06 ENCOUNTER — TRANSCRIPTION ENCOUNTER (OUTPATIENT)
Age: 88
End: 2023-02-06

## 2023-02-06 LAB
ALBUMIN SERPL ELPH-MCNC: 2.7 G/DL — LOW (ref 3.3–5)
ALP SERPL-CCNC: 119 U/L — SIGNIFICANT CHANGE UP (ref 40–120)
ALT FLD-CCNC: 43 U/L — SIGNIFICANT CHANGE UP (ref 10–45)
ANION GAP SERPL CALC-SCNC: 6 MMOL/L — SIGNIFICANT CHANGE UP (ref 5–17)
AST SERPL-CCNC: 33 U/L — SIGNIFICANT CHANGE UP (ref 10–40)
BILIRUB SERPL-MCNC: 0.8 MG/DL — SIGNIFICANT CHANGE UP (ref 0.2–1.2)
BUN SERPL-MCNC: 14 MG/DL — SIGNIFICANT CHANGE UP (ref 7–23)
CALCIUM SERPL-MCNC: 8.7 MG/DL — SIGNIFICANT CHANGE UP (ref 8.4–10.5)
CHLORIDE SERPL-SCNC: 102 MMOL/L — SIGNIFICANT CHANGE UP (ref 96–108)
CO2 SERPL-SCNC: 29 MMOL/L — SIGNIFICANT CHANGE UP (ref 22–31)
CREAT SERPL-MCNC: 0.92 MG/DL — SIGNIFICANT CHANGE UP (ref 0.5–1.3)
EGFR: 81 ML/MIN/1.73M2 — SIGNIFICANT CHANGE UP
GLUCOSE BLDC GLUCOMTR-MCNC: 149 MG/DL — HIGH (ref 70–99)
GLUCOSE SERPL-MCNC: 149 MG/DL — HIGH (ref 70–99)
HCT VFR BLD CALC: 30.3 % — LOW (ref 39–50)
HGB BLD-MCNC: 9.8 G/DL — LOW (ref 13–17)
MCHC RBC-ENTMCNC: 30.7 PG — SIGNIFICANT CHANGE UP (ref 27–34)
MCHC RBC-ENTMCNC: 32.3 GM/DL — SIGNIFICANT CHANGE UP (ref 32–36)
MCV RBC AUTO: 95 FL — SIGNIFICANT CHANGE UP (ref 80–100)
NRBC # BLD: 0 /100 WBCS — SIGNIFICANT CHANGE UP (ref 0–0)
PLATELET # BLD AUTO: 418 K/UL — HIGH (ref 150–400)
POTASSIUM SERPL-MCNC: 3.9 MMOL/L — SIGNIFICANT CHANGE UP (ref 3.5–5.3)
POTASSIUM SERPL-SCNC: 3.9 MMOL/L — SIGNIFICANT CHANGE UP (ref 3.5–5.3)
PROT SERPL-MCNC: 6.1 G/DL — SIGNIFICANT CHANGE UP (ref 6–8.3)
RBC # BLD: 3.19 M/UL — LOW (ref 4.2–5.8)
RBC # FLD: 16.3 % — HIGH (ref 10.3–14.5)
SODIUM SERPL-SCNC: 137 MMOL/L — SIGNIFICANT CHANGE UP (ref 135–145)
WBC # BLD: 6.5 K/UL — SIGNIFICANT CHANGE UP (ref 3.8–10.5)
WBC # FLD AUTO: 6.5 K/UL — SIGNIFICANT CHANGE UP (ref 3.8–10.5)

## 2023-02-06 PROCEDURE — 99232 SBSQ HOSP IP/OBS MODERATE 35: CPT

## 2023-02-06 RX ORDER — POLYETHYLENE GLYCOL 3350 17 G/17G
17 POWDER, FOR SOLUTION ORAL
Qty: 0 | Refills: 0 | DISCHARGE
Start: 2023-02-06

## 2023-02-06 RX ORDER — TIZANIDINE 4 MG/1
1 TABLET ORAL
Qty: 30 | Refills: 0
Start: 2023-02-06 | End: 2023-03-07

## 2023-02-06 RX ORDER — OXYCODONE HYDROCHLORIDE 5 MG/1
1 TABLET ORAL
Qty: 21 | Refills: 0
Start: 2023-02-06 | End: 2023-02-12

## 2023-02-06 RX ORDER — METFORMIN HYDROCHLORIDE 850 MG/1
1 TABLET ORAL
Qty: 60 | Refills: 0
Start: 2023-02-06 | End: 2023-03-07

## 2023-02-06 RX ORDER — SENNA PLUS 8.6 MG/1
2 TABLET ORAL AT BEDTIME
Refills: 0 | Status: DISCONTINUED | OUTPATIENT
Start: 2023-02-08 | End: 2023-02-10

## 2023-02-06 RX ORDER — ACETAMINOPHEN 500 MG
2 TABLET ORAL
Qty: 0 | Refills: 0 | DISCHARGE
Start: 2023-02-06

## 2023-02-06 RX ORDER — LISINOPRIL 2.5 MG/1
1 TABLET ORAL
Qty: 30 | Refills: 0
Start: 2023-02-06 | End: 2023-03-07

## 2023-02-06 RX ORDER — SENNA PLUS 8.6 MG/1
2 TABLET ORAL
Qty: 0 | Refills: 0 | DISCHARGE
Start: 2023-02-06

## 2023-02-06 RX ORDER — LIDOCAINE 4 G/100G
1 CREAM TOPICAL
Qty: 0 | Refills: 0 | DISCHARGE
Start: 2023-02-06

## 2023-02-06 RX ORDER — TAMSULOSIN HYDROCHLORIDE 0.4 MG/1
1 CAPSULE ORAL
Qty: 30 | Refills: 0
Start: 2023-02-06 | End: 2023-03-07

## 2023-02-06 RX ORDER — FAMOTIDINE 10 MG/ML
1 INJECTION INTRAVENOUS
Qty: 0 | Refills: 0 | DISCHARGE
Start: 2023-02-06

## 2023-02-06 RX ORDER — CHOLECALCIFEROL (VITAMIN D3) 125 MCG
2000 CAPSULE ORAL
Qty: 0 | Refills: 0 | DISCHARGE
Start: 2023-02-06

## 2023-02-06 RX ORDER — METOPROLOL TARTRATE 50 MG
1 TABLET ORAL
Qty: 60 | Refills: 0
Start: 2023-02-06 | End: 2023-03-07

## 2023-02-06 RX ORDER — POLYETHYLENE GLYCOL 3350 17 G/17G
17 POWDER, FOR SOLUTION ORAL
Refills: 0 | Status: DISCONTINUED | OUTPATIENT
Start: 2023-02-06 | End: 2023-02-10

## 2023-02-06 RX ORDER — ATORVASTATIN CALCIUM 80 MG/1
1 TABLET, FILM COATED ORAL
Qty: 30 | Refills: 0
Start: 2023-02-06 | End: 2023-03-07

## 2023-02-06 RX ADMIN — METFORMIN HYDROCHLORIDE 1000 MILLIGRAM(S): 850 TABLET ORAL at 07:54

## 2023-02-06 RX ADMIN — Medication 650 MILLIGRAM(S): at 17:17

## 2023-02-06 RX ADMIN — Medication 2000 UNIT(S): at 09:32

## 2023-02-06 RX ADMIN — Medication 650 MILLIGRAM(S): at 08:42

## 2023-02-06 RX ADMIN — OXYCODONE HYDROCHLORIDE 5 MILLIGRAM(S): 5 TABLET ORAL at 21:42

## 2023-02-06 RX ADMIN — HEPARIN SODIUM 5000 UNIT(S): 5000 INJECTION INTRAVENOUS; SUBCUTANEOUS at 05:27

## 2023-02-06 RX ADMIN — LISINOPRIL 10 MILLIGRAM(S): 2.5 TABLET ORAL at 05:27

## 2023-02-06 RX ADMIN — OXYCODONE HYDROCHLORIDE 5 MILLIGRAM(S): 5 TABLET ORAL at 20:49

## 2023-02-06 RX ADMIN — LIDOCAINE 1 PATCH: 4 CREAM TOPICAL at 19:00

## 2023-02-06 RX ADMIN — FAMOTIDINE 20 MILLIGRAM(S): 10 INJECTION INTRAVENOUS at 11:44

## 2023-02-06 RX ADMIN — HEPARIN SODIUM 5000 UNIT(S): 5000 INJECTION INTRAVENOUS; SUBCUTANEOUS at 17:09

## 2023-02-06 RX ADMIN — Medication 650 MILLIGRAM(S): at 18:09

## 2023-02-06 RX ADMIN — Medication 25 MILLIGRAM(S): at 05:26

## 2023-02-06 RX ADMIN — Medication 650 MILLIGRAM(S): at 07:55

## 2023-02-06 RX ADMIN — TAMSULOSIN HYDROCHLORIDE 0.4 MILLIGRAM(S): 0.4 CAPSULE ORAL at 20:50

## 2023-02-06 RX ADMIN — LIDOCAINE 1 PATCH: 4 CREAM TOPICAL at 05:27

## 2023-02-06 RX ADMIN — Medication 25 MILLIGRAM(S): at 17:09

## 2023-02-06 RX ADMIN — METFORMIN HYDROCHLORIDE 1000 MILLIGRAM(S): 850 TABLET ORAL at 17:09

## 2023-02-06 RX ADMIN — ATORVASTATIN CALCIUM 80 MILLIGRAM(S): 80 TABLET, FILM COATED ORAL at 20:50

## 2023-02-06 RX ADMIN — LIDOCAINE 1 PATCH: 4 CREAM TOPICAL at 07:54

## 2023-02-06 RX ADMIN — TIZANIDINE 2 MILLIGRAM(S): 4 TABLET ORAL at 20:51

## 2023-02-06 NOTE — DISCHARGE NOTE PROVIDER - PROVIDER TOKENS
PROVIDER:[TOKEN:[[id number]],FOLLOWUP:[1 month]],PROVIDER:[TOKEN:[[id number]],FOLLOWUP:[1 week]] PROVIDER:[TOKEN:[[id number]],FOLLOWUP:[1 week]],PROVIDER:[TOKEN:[[id number]],FOLLOWUP:[1 month]]

## 2023-02-06 NOTE — PROGRESS NOTE ADULT - SUBJECTIVE AND OBJECTIVE BOX
CC: Lumbar Radiculopathy    Today's Subjective & Objective Findings:  Patient seen and examined in PT this AM.  States that he slept well last night.   Last BM on 2/5.  Lidocaine patch, warm & cold compress helping with back pain.  Feels like back pain is improving overall.   No other complaints.    Denies headache, dizziness, visual changes, chest pain, SOB/LUNA, abdominal pain, nausea, vomiting, diarrhea, dysuria, numbness or tingling.    Therapy-- engaging, motivated.  Pain is a limiting factor, overall improving.  Feels like therapies are going well, and that he is getting stronger.     Vital Signs Last 24 Hrs  T(C): 36.9 (06 Feb 2023 07:51), Max: 36.9 (06 Feb 2023 07:51)  T(F): 98.4 (06 Feb 2023 07:51), Max: 98.4 (06 Feb 2023 07:51)  HR: 88 (06 Feb 2023 07:51) (88 - 96)  BP: 116/70 (06 Feb 2023 07:51) (113/75 - 119/73)  BP(mean): --  RR: 16 (06 Feb 2023 07:51) (16 - 16)  SpO2: 94% (06 Feb 2023 07:51) (94% - 95%)    PHYSICAL EXAM:  Gen - NAD, Comfortable  HEENT - NCAT, EOMI, MMM  Neck - Supple, No limited ROM  Pulm - CTAB, No wheeze, No rhonchi, No crackles  Cardiovascular - RRR, S1S2  Chest - good chest expansion, good respiratory effort  Abdomen - Soft, NT/ND, +BS  Extremities - No Cyanosis, no clubbing, no edema, no calf tenderness  Neuro-      Cognitive - awake, alert, oriented to person, place, date, year, and situation.  Able to follow command     Communication - Fluent     Attention: Intact,      Judgement: Good evidence of judgement     Cranial Nerves - CN 2-12 intact.     Motor -                     LEFT    UE - 4+/5                    RIGHT UE - 4+/5                    LEFT    LE - 4-/5                    RIGHT LE - 3+/5        Sensory - Intact to LT      Reflexes - DTR Intact, No primitive reflexive  Skin:  thoracic spine incision + 2 drain site with surgiglue, Right flank incision with surgiglue BRANDI      LAB                        9.8    6.50  )-----------( 418      ( 06 Feb 2023 06:20 )             30.3     02-06    137  |  102  |  14  ----------------------------<  149<H>  3.9   |  29  |  0.92    Ca    8.7      06 Feb 2023 06:20    TPro  6.1  /  Alb  2.7<L>  /  TBili  0.8  /  DBili  x   /  AST  33  /  ALT  43  /  AlkPhos  119  02-06    LIVER FUNCTIONS - ( 06 Feb 2023 06:20 )  Alb: 2.7 g/dL / Pro: 6.1 g/dL / ALK PHOS: 119 U/L / ALT: 43 U/L / AST: 33 U/L / GGT: x             MEDICATIONS  (STANDING):  atorvastatin 80 milliGRAM(s) Oral at bedtime  cholecalciferol 2000 Unit(s) Oral <User Schedule>  dextrose 5%. 1000 milliLiter(s) (100 mL/Hr) IV Continuous <Continuous>  dextrose 5%. 1000 milliLiter(s) (50 mL/Hr) IV Continuous <Continuous>  dextrose 50% Injectable 25 Gram(s) IV Push once  dextrose 50% Injectable 12.5 Gram(s) IV Push once  dextrose 50% Injectable 25 Gram(s) IV Push once  famotidine    Tablet 20 milliGRAM(s) Oral daily  glucagon  Injectable 1 milliGRAM(s) IntraMuscular once  heparin   Injectable 5000 Unit(s) SubCutaneous every 12 hours  insulin lispro (ADMELOG) corrective regimen sliding scale   SubCutaneous two times a day before meals  lidocaine   4% Patch 1 Patch Transdermal <User Schedule>  lisinopril 10 milliGRAM(s) Oral daily  metFORMIN 1000 milliGRAM(s) Oral two times a day with meals  metoprolol tartrate 25 milliGRAM(s) Oral two times a day  tamsulosin 0.4 milliGRAM(s) Oral at bedtime  tiZANidine 2 milliGRAM(s) Oral at bedtime    MEDICATIONS  (PRN):  acetaminophen     Tablet .. 650 milliGRAM(s) Oral every 6 hours PRN Temp greater or equal to 38C (100.4F), Mild Pain (1 - 3)  dextrose Oral Gel 15 Gram(s) Oral once PRN Blood Glucose LESS THAN 70 milliGRAM(s)/deciliter  oxyCODONE    IR 5 milliGRAM(s) Oral every 6 hours PRN Moderate Pain (4 - 6)  polyethylene glycol 3350 17 Gram(s) Oral two times a day PRN Constipation

## 2023-02-06 NOTE — DISCHARGE NOTE PROVIDER - HOSPITAL COURSE
HPI:  This is an 88 YO male with PMH of HTN, HLD,  Nonobstructive CAD, prostate cancer s/p RT,  DM II, Arthritis s/p Right total knee replacement in 2008, squamous cell carcinoma of scalp, BPH, ASHD, Chronic lower back pain secondary to Lumbar stenosis with neurogenic claudication, COVID 2020. Pt reports he has chronic lower back pain radiating down to B/L legs (Left > right) for several years and progressively worsened with walking and ADLs in the past 6 months. Pt describes the pain as "sharp" rates at 9 out of 10. Conservative therapies including NSAIDs, steroid injections, physical therapy, ice/heat and rest have provided minimal relief from pain.    MRI of lumbar spine revealed  Convex left curvature with straightening of the lordosis, multilevel loss of disc signal and height and anterior spurring and bulging from L2-L3 through L5-S1. No vertebral body compression fracture.    01/24/2023, he had right posterior spine  L3-L5 extreme lateral lumbar interbody fusion, L2-S1 posterior column osteotomy, facetectomy, foraminotomy, L3-L5 posterior instrumented fusion    paraspineous muscle flap wound closure. He was extubated on 01/25/2023 and transitioned  to 3L N/C. Drain removed 01/27. Patient was evaluated by PM&R and therapy for functional deficits, gait/ADL impairments and acute rehabilitation was recommended. Patient was medically optimized for discharge to [facility] IRU on 1/27/23. (27 Jan 2023 15:20)      Patient was evaluated by PM&R and therapy for gait/ADL impairments and recommended acute rehabilitation. Patient was medically optimized for discharge to [facility] on 1/27/23. Admitted with gait instability, ADL, and functional impairments.     Rehab course significant for:  1/31: Melatonin @ HS . added metformin 500 mg BID. Metformin added.   2/1: Miralax to BID. Fleet enema x1 dose.   2/2: Successful BM after enema. Warm compress at HS, cold compress during daytime. DC melatonin. Tizanidine at HS.   2/3: Lidocaine patch to R lower back. ISS DCd per hospitalist.       All other medical co-morbidities were stable. Patient tolerated course of inpatient PT/OT/SLP rehab with significant improvements and met rehab goals prior to discharge. Patient was medically cleared on 2/10/23 for discharge to home with home care. HPI:  This is an 88 YO male with PMH of HTN, HLD,  Nonobstructive CAD, prostate cancer s/p RT,  DM II, Arthritis s/p Right total knee replacement in 2008, squamous cell carcinoma of scalp, BPH, ASHD, Chronic lower back pain secondary to Lumbar stenosis with neurogenic claudication, COVID 2020. Pt reports he has chronic lower back pain radiating down to B/L legs (Left > right) for several years and progressively worsened with walking and ADLs in the past 6 months. Pt describes the pain as "sharp" rates at 9 out of 10. Conservative therapies including NSAIDs, steroid injections, physical therapy, ice/heat and rest have provided minimal relief from pain.    MRI of lumbar spine revealed  Convex left curvature with straightening of the lordosis, multilevel loss of disc signal and height and anterior spurring and bulging from L2-L3 through L5-S1. No vertebral body compression fracture.    01/24/2023, he had right posterior spine  L3-L5 extreme lateral lumbar interbody fusion, L2-S1 posterior column osteotomy, facetectomy, foraminotomy, L3-L5 posterior instrumented fusion    paraspineous muscle flap wound closure. He was extubated on 01/25/2023 and transitioned  to 3L N/C. Drain removed 01/27. Patient was evaluated by PM&R and therapy for functional deficits, gait/ADL impairments and acute rehabilitation was recommended. Patient was medically optimized for discharge to [facility] IRU on 1/27/23. (27 Jan 2023 15:20)      Patient was evaluated by PM&R and therapy for gait/ADL impairments and recommended acute rehabilitation. Patient was medically optimized for discharge to [facility] on 1/27/23. Admitted with gait instability, ADL, and functional impairments.     Rehab course significant for:  1/31: Melatonin @ HS . added metformin 500 mg BID. Metformin added.   2/1: Miralax to BID. Fleet enema x1 dose.   2/2: Successful BM after enema. Warm compress at HS, cold compress during daytime. DC melatonin. Tizanidine at HS.   2/3: Lidocaine patch to R lower back. ISS DCd per hospitalist.   2/9: Plan for DC home tomorrow.       All other medical co-morbidities were stable. Patient tolerated course of inpatient PT/OT/SLP rehab with significant improvements and met rehab goals prior to discharge. Patient was medically cleared on 2/10/23 for discharge to home with home care. HPI:  This is an 86 YO male with PMH of HTN, HLD,  Nonobstructive CAD, prostate cancer s/p RT,  DM II, Arthritis s/p Right total knee replacement in 2008, squamous cell carcinoma of scalp, BPH, ASHD, Chronic lower back pain secondary to Lumbar stenosis with neurogenic claudication, COVID 2020. Pt reports he has chronic lower back pain radiating down to B/L legs (Left > right) for several years and progressively worsened with walking and ADLs in the past 6 months. Pt describes the pain as "sharp" rates at 9 out of 10. Conservative therapies including NSAIDs, steroid injections, physical therapy, ice/heat and rest have provided minimal relief from pain.    MRI of lumbar spine revealed  Convex left curvature with straightening of the lordosis, multilevel loss of disc signal and height and anterior spurring and bulging from L2-L3 through L5-S1. No vertebral body compression fracture.    01/24/2023, he had right posterior spine  L3-L5 extreme lateral lumbar interbody fusion, L2-S1 posterior column osteotomy, facetectomy, foraminotomy, L3-L5 posterior instrumented fusion    paraspineous muscle flap wound closure. He was extubated on 01/25/2023 and transitioned  to 3L N/C. Drain removed 01/27. Patient was evaluated by PM&R and therapy for functional deficits, gait/ADL impairments and acute rehabilitation was recommended. Patient was medically optimized for discharge to [facility] IRU on 1/27/23. (27 Jan 2023 15:20)      Patient was evaluated by PM&R and therapy for gait/ADL impairments and recommended acute rehabilitation. Patient was medically optimized for discharge to [facility] on 1/27/23. Admitted with gait instability, ADL, and functional impairments.     You made sustained functional improvement  Ambulating increasing distance with with supervision  Improved motor function  Surgical area skin edges together and healing   Muscle spasms controlled with low dose tizanidine with plan to d/c during out patient  Pain controlled, aim to d/c low dose oxycodone during out patient, in probably one week time    Rehab course significant for:  1/31: Melatonin @ HS . added metformin 500 mg BID. Metformin added.   2/1: Miralax to BID. Fleet enema x1 dose.   2/2: Successful BM after enema. Warm compress at HS, cold compress during daytime. DC melatonin. Tizanidine at HS.   2/3: Lidocaine patch to R lower back. ISS DCd per hospitalist.   2/9: Plan for DC home 2.10.23    IDT conference on 2/9  TDD: 2/10 to home  Barriers: --  Social Work: Lives in  with [facility] with [facility] setup. Independent PTA.  OT: Mod I/ supervision for dressing. Mod A for tub shower. Family aware.   PT: Mod I. Ready for home DC.   SLP: --n/a       All other medical co-morbidities were stable. Patient tolerated course of inpatient PT/OT/SLP rehab with significant improvements and met rehab goals prior to discharge. Patient was medically cleared on 2/10/23 for discharge to home with home care.

## 2023-02-06 NOTE — PROGRESS NOTE ADULT - ASSESSMENT
This is an 88 y/o M with PMH of HTN, HLD, Nonobstructive CAD, prostate cancer s/p RT, T2DM, Arthritis s/p Right total knee replacement in 2008, squamous cell carcinoma of scalp, BPH, ASHD, Chronic lower back pain secondary to Lumbar stenosis with neurogenic claudication, COVID 2020 who presented to [facility] on 1/24 with c/o lower back pain radiating down to B/L legs that has progressively worsened and has failed conservation treatments. MRI of lumbar spine revealed  Convex left curvature with straightening of the lordosis, multilevel loss of disc signal and height and anterior spurring and bulging from L2-L3 through L5-S1. He is s/p right posterior spine L3-L5 extreme lateral lumbar interbody fusion, L2-S1 posterior column osteotomy, facetectomy, foraminotomy, L3-L5 posterior instrumented fusion paraspinous muscle flap wound closure on 1/24. Patient now with gait Instability, ADL impairments and Functional impairments.    #Lumbar Radiculopathy s/p L3-L5 extreme lateral lumbar interbody fusion, L2-S1 posterior column osteotomy, facetectomy, foraminotomy, L3-L5 posterior instrumented fusion paraspinous muscle flap wound closure on 1/24  -continue comprehensive rehab program   -Pain management, bowel regimen per rehab     # Anemia  - no active s/s bleeding, likely post op anemia   - continue to monitor    #HLD  -Continue Lipitor     #HTN  -Continue Lisinopril and Metoprolol    #DM II and hyperglycemia  - A1c 8.5  -ISS and FS  -D/C'd lantus. Metformin 1000 mg BID    #BPH  -Continue Flomax     #Transaminitis - resolved  -Could be due to tizanidine    #DVT ppx - HSQ  #GI ppx - Pepcid   This is an 88 y/o M with PMH of HTN, HLD, Nonobstructive CAD, prostate cancer s/p RT, T2DM, Arthritis s/p Right total knee replacement in 2008, squamous cell carcinoma of scalp, BPH, ASHD, Chronic lower back pain secondary to Lumbar stenosis with neurogenic claudication, COVID 2020 who presented to [facility] on 1/24 with c/o lower back pain radiating down to B/L legs that has progressively worsened and has failed conservation treatments. MRI of lumbar spine revealed  Convex left curvature with straightening of the lordosis, multilevel loss of disc signal and height and anterior spurring and bulging from L2-L3 through L5-S1. He is s/p right posterior spine L3-L5 extreme lateral lumbar interbody fusion, L2-S1 posterior column osteotomy, facetectomy, foraminotomy, L3-L5 posterior instrumented fusion paraspinous muscle flap wound closure on 1/24. Patient now with gait Instability, ADL impairments and Functional impairments.    #Lumbar Radiculopathy s/p L3-L5 extreme lateral lumbar interbody fusion, L2-S1 posterior column osteotomy, facetectomy, foraminotomy, L3-L5 posterior instrumented fusion paraspinous muscle flap wound closure on 1/24  -continue comprehensive rehab program   -Pain management, bowel regimen per rehab     # Anemia  - no active s/s bleeding, likely post op anemia   - continue to monitor    #HLD  -Continue Lipitor     #HTN  -Continue Lisinopril and Metoprolol    #DM II and hyperglycemia  - A1c 8.5  -D/C ISS and FS  -D/C'd lantus. Metformin 1000 mg BID    #BPH  -Continue Flomax     #Transaminitis - resolved  -Could be due to tizanidine    #DVT ppx - HSQ  #GI ppx - Pepcid

## 2023-02-06 NOTE — PROGRESS NOTE ADULT - SUBJECTIVE AND OBJECTIVE BOX
Patient is a 87y old  Male who presents with a chief complaint of Lumbar Radiculopathy (06 Feb 2023 09:11)    Patient seen and examined at bedside.  No overnight events  No complaints this morning    ALLERGIES:  clindamycin (Unknown)  gentamicin (Unknown)    MEDICATIONS  (STANDING):  atorvastatin 80 milliGRAM(s) Oral at bedtime  cholecalciferol 2000 Unit(s) Oral <User Schedule>  dextrose 5%. 1000 milliLiter(s) (100 mL/Hr) IV Continuous <Continuous>  dextrose 5%. 1000 milliLiter(s) (50 mL/Hr) IV Continuous <Continuous>  dextrose 50% Injectable 25 Gram(s) IV Push once  dextrose 50% Injectable 12.5 Gram(s) IV Push once  dextrose 50% Injectable 25 Gram(s) IV Push once  famotidine    Tablet 20 milliGRAM(s) Oral daily  glucagon  Injectable 1 milliGRAM(s) IntraMuscular once  heparin   Injectable 5000 Unit(s) SubCutaneous every 12 hours  insulin lispro (ADMELOG) corrective regimen sliding scale   SubCutaneous two times a day before meals  lidocaine   4% Patch 1 Patch Transdermal <User Schedule>  lisinopril 10 milliGRAM(s) Oral daily  metFORMIN 1000 milliGRAM(s) Oral two times a day with meals  metoprolol tartrate 25 milliGRAM(s) Oral two times a day  tamsulosin 0.4 milliGRAM(s) Oral at bedtime  tiZANidine 2 milliGRAM(s) Oral at bedtime    MEDICATIONS  (PRN):  acetaminophen     Tablet .. 650 milliGRAM(s) Oral every 6 hours PRN Temp greater or equal to 38C (100.4F), Mild Pain (1 - 3)  dextrose Oral Gel 15 Gram(s) Oral once PRN Blood Glucose LESS THAN 70 milliGRAM(s)/deciliter  oxyCODONE    IR 5 milliGRAM(s) Oral every 6 hours PRN Moderate Pain (4 - 6)  polyethylene glycol 3350 17 Gram(s) Oral two times a day PRN Constipation    Vital Signs Last 24 Hrs  T(F): 98.4 (06 Feb 2023 07:51), Max: 98.4 (06 Feb 2023 07:51)  HR: 88 (06 Feb 2023 07:51) (88 - 96)  BP: 116/70 (06 Feb 2023 07:51) (113/75 - 119/73)  RR: 16 (06 Feb 2023 07:51) (16 - 16)  SpO2: 94% (06 Feb 2023 07:51) (94% - 95%)  I&O's Summary    PHYSICAL EXAM:  GENERAL: NAD  HENT:  Atraumatic, Normocephalic; No tonsillar erythema, exudates, or enlargement; Moist mucous membranes;   EYES: EOMI, PERRLA, conjunctiva and sclera clear, no lid-lag  NECK: Supple, No JVD, Normal thyroid  CHEST/LUNG: Clear to percussion bilaterally; No rales, rhonchi, wheezing, or rubs; normal respiratory effort, no intercostal retractions  HEART: Regular rate and rhythm; No murmurs, rubs, or gallops; No pitting edema  ABDOMEN: Soft, Nontender, Nondistended; Bowel sounds present; No HSM  MUSCULOSKELETAL/EXTREMITIES:  2+ Peripheral Pulses, No clubbing or digital cyanosis  PSYCH: Appropriate affect, Alert & Awake; Good judgement    LABS:                        9.8    6.50  )-----------( 418      ( 06 Feb 2023 06:20 )             30.3       02-06    137  |  102  |  14  ----------------------------<  149  3.9   |  29  |  0.92    Ca    8.7      06 Feb 2023 06:20    TPro  6.1  /  Alb  2.7  /  TBili  0.8  /  DBili  x   /  AST  33  /  ALT  43  /  AlkPhos  119  02-06     POCT Blood Glucose.: 149 mg/dL (06 Feb 2023 07:53)  POCT Blood Glucose.: 169 mg/dL (05 Feb 2023 16:54)    COVID-19 PCR: NotDetec (01-27-23 @ 19:06)    Care Discussed with Rehab Attending and Other Providers

## 2023-02-06 NOTE — DISCHARGE NOTE PROVIDER - NSDCFUADDAPPT_GEN_ALL_CORE_FT
Please make an appointment with the following provider(s):    Leo Smith MD  Neurosurgery  [address]  [address]  [place]. [address] [phone] Please make an appointment with the following provider(s):    Leo Smith MD  Neurosurgery  [address]  [address]  [place]. [address] [phone]    Dr. Rapp (PCP)

## 2023-02-06 NOTE — DISCHARGE NOTE PROVIDER - NSDCMRMEDTOKEN_GEN_ALL_CORE_FT
acetaminophen 325 mg oral tablet: 2 tab(s) orally every 6 hours, As needed, Temp greater or equal to 38C (100.4F), Mild Pain (1 - 3)  cholecalciferol oral tablet: 2000 unit(s) orally once a day  famotidine 20 mg oral tablet: 1 tab(s) orally once a day  lidocaine 4% topical film: Apply topically to affected area once a day    Okay to substitute Salonpas OTC patches  polyethylene glycol 3350 oral powder for reconstitution: 17 gram(s) orally 2 times a day, As needed, Constipation  senna leaf extract oral tablet: 2 tab(s) orally once a day (at bedtime)   acetaminophen 325 mg oral tablet: 2 tab(s) orally every 6 hours, As needed, Temp greater or equal to 38C (100.4F), Mild Pain (1 - 3)  atorvastatin 80 mg oral tablet: 1 tab(s) orally once a day (at bedtime)   cholecalciferol oral tablet: 2000 unit(s) orally once a day  famotidine 20 mg oral tablet: 1 tab(s) orally once a day  lidocaine 4% topical film: Apply topically to affected area once a day    Okay to substitute Salonpas OTC patches  lisinopril 10 mg oral tablet: 1 tab(s) orally once a day  metFORMIN 1000 mg oral tablet: 1 tab(s) orally 2 times a day (with meals)  metoprolol tartrate 25 mg oral tablet: 1 tab(s) orally 2 times a day  oxyCODONE 5 mg oral tablet: 1 tab(s) orally every 8 hours, As Needed -Moderate Pain (4 - 6) MDD:3 tabs  polyethylene glycol 3350 oral powder for reconstitution: 17 gram(s) orally 2 times a day, As needed, Constipation  senna leaf extract oral tablet: 2 tab(s) orally once a day (at bedtime)  tamsulosin 0.4 mg oral capsule: 1 cap(s) orally once a day (at bedtime)  tiZANidine 2 mg oral tablet: 1 tab(s) orally once a day (at bedtime) MDD:1 tab

## 2023-02-06 NOTE — DISCHARGE NOTE PROVIDER - CARE PROVIDER_API CALL
Ame Vincent ()  Brain Injury Medicine; PhysicalRehab Medicine  [address] [place] [address]  Phone: [phone]  Fax: [phone]  Follow Up Time: 1 month    Edmond Mckeon)  Plastic Surgery  [address] [place] [address]  Phone: [phone]  Fax: [phone]  Follow Up Time: 1 week   Edmond Mckeon)  Plastic Surgery  [address] [place] [address]  Phone: [phone]  Fax: [phone]  Follow Up Time: 1 week    Gabby Noriega; MPH)  Surgery  Phys Medicine  [facility]  [address] [place] [address]  Phone: [phone]  Fax: [phone]  Follow Up Time: 1 month

## 2023-02-06 NOTE — CHART NOTE - NSCHARTNOTEFT_GEN_A_CORE
Nutrition Follow Up Note  Hospital Course   (Per Electronic Medical Record)    Source:  Patient [X]  Medical Record [X]      Diet: Diet, Consistent Carbohydrate/No Snacks:   DASH/TLC {Sodium & Cholesterol Restricted}  Supplement Feeding Modality:  Oral  Glucerna Shake Cans or Servings Per Day:  1       Frequency:  Daily (01-30-23 @ 13:53) [Active]    At this time patient w/ adequate appetite/intake consuming % of meals per patient report. No issues w/ chewing and swallowing. Denies N/V/C/D, last BM 2/5.  Noted w/ elevated POCT: 148-169 mg/dL in last 24 hours continue to monitor.    Enteral/Parenteral Nutrition: Not Applicable    Current Weight: 162.4lb on 1/31    Pertinent Medications: MEDICATIONS  (STANDING):  atorvastatin 80 milliGRAM(s) Oral at bedtime  cholecalciferol 2000 Unit(s) Oral <User Schedule>  dextrose 5%. 1000 milliLiter(s) (100 mL/Hr) IV Continuous <Continuous>  dextrose 5%. 1000 milliLiter(s) (50 mL/Hr) IV Continuous <Continuous>  dextrose 50% Injectable 25 Gram(s) IV Push once  dextrose 50% Injectable 12.5 Gram(s) IV Push once  dextrose 50% Injectable 25 Gram(s) IV Push once  famotidine    Tablet 20 milliGRAM(s) Oral daily  glucagon  Injectable 1 milliGRAM(s) IntraMuscular once  heparin   Injectable 5000 Unit(s) SubCutaneous every 12 hours  lidocaine   4% Patch 1 Patch Transdermal <User Schedule>  lisinopril 10 milliGRAM(s) Oral daily  metFORMIN 1000 milliGRAM(s) Oral two times a day with meals  metoprolol tartrate 25 milliGRAM(s) Oral two times a day  tamsulosin 0.4 milliGRAM(s) Oral at bedtime  tiZANidine 2 milliGRAM(s) Oral at bedtime    MEDICATIONS  (PRN):  acetaminophen     Tablet .. 650 milliGRAM(s) Oral every 6 hours PRN Temp greater or equal to 38C (100.4F), Mild Pain (1 - 3)  dextrose Oral Gel 15 Gram(s) Oral once PRN Blood Glucose LESS THAN 70 milliGRAM(s)/deciliter  oxyCODONE    IR 5 milliGRAM(s) Oral every 6 hours PRN Moderate Pain (4 - 6)  polyethylene glycol 3350 17 Gram(s) Oral two times a day PRN Constipation      Pertinent Labs:  02-06 Na137 mmol/L Glu 149 mg/dL<H> K+ 3.9 mmol/L Cr  0.92 mg/dL BUN 14 mg/dL 02-06 Alb 2.7 g/dL<L>      Skin: Surgical Incision, Site Lumbar spine, Moisture Associated Dematitis (Skin Folds) sacral/gluteal     Edema: No edema noted per nursing flowsheet    Last Bowel Movement: on 2/5 Per nursing flowsheets     Estimated Needs:   [X] No Change Since Previous Assessment    Previous Nutrition Diagnosis:   Inadequate Oral Intake  Inadequate Protein Energy Intake  Unintended Weight Loss  related to suboptimal nutrient intake 2/2 pain  Patient report of poor intake and 30lb weight loss x 1 year      Nutrition Diagnosis is [X] Ongoing - addressed w/ PO intake >75% at meals     Altered Nutrition Related Lab Values  related to DM  as evidenced by elevated A1C 7.6    Nutrition Diagnosis is [X] Ongoing - addressed w/ Consistent Carbohydrate diet.    New Nutrition Diagnosis: [X] Not Applicable    Interventions:   1. Recommend Continue Nutrition Plan of Care.    Monitoring & Evaluation:   [X] Weights   [X] PO Intake   [X] Skin Integrity   [X] Follow Up (Per Protocol)  [X] Tolerance to Diet Prescription   [X] Other: Labs & POCT    Registered Dietitian/Nutritionist Remains Available.  Tash Denny RD, MS, CDN    Phone# [phone]

## 2023-02-06 NOTE — PROGRESS NOTE ADULT - ASSESSMENT
88 YO male with PMH of HTN, HLD,  Nonobstructive CAD, prostate cancer s/p RT,  DM II, Arthritis s/p Right total knee replacement in 2008, squamous cell carcinoma of scalp, BPH, ASHD, Chronic lower back pain secondary to Lumbar stenosis with neurogenic claudication, COVID 2020 who presented to [facility] on 1/24 with c/o  lower back pain radiating down to B/L legs that has  progressively worsened and has failed conservation treatments.    MRI of lumbar spine revealed  Convex left curvature with straightening of the lordosis, multilevel loss of disc signal and height and anterior spurring and bulging from L2-L3 through L5-S1. He is s/p right posterior spine  L3-L5 extreme lateral lumbar interbody fusion, L2-S1 posterior column osteotomy, facetectomy, foraminotomy, L3-L5 posterior instrumented fusion  paraspineous muscle flap wound closure on 1/24.  Patient now with gait Instability, ADL impairments and Functional impairments.    * Pain management- warm/cold compress- Lidocaine patch * Tizanidine 2 mg @ HS * Continue to monitor functional progress * Bowel regimen adjusted *     #Lumbar Radiculopathy  - MRI of lumbar spine revealed  Convex left curvature with straightening of the lordosis, multilevel loss of disc signal and height and anterior spurring and bulging from L2-L3 through L5-S1.   - now s/p   L3-L5 extreme lateral lumbar interbody fusion, L2-S1 posterior column osteotomy, facetectomy, foraminotomy, L3-L5 posterior instrumented fusion  paraspineous muscle flap wound closure on 1/24.  - Surgical site with dermabond- well approximated  - Start Comprehensive Rehab Program: PT/OT 3hours daily and 5 days weekly  - PT: Focused on improving strength, endurance, coordination, balance, functional mobility, and transfers  - OT: Focused on improving strength, fine motor skills, coordination, posture and ADLs.      #HLD  - Lipitor 80mg daily    #HTN  - Lisinopril  10mg daily  - Metoprolol 25mg BID    #DM II  - ISS and FS  - Admelog and Lantus    #BPH  - Flomax 0.4mg daily    #Pain management  - Tylenol PRN, Oxycodone PRN, Zanaflex 2mg prn  - Warm or cold compress  - Tizanidine 2 mg @ HS   - Lidocaine patch daily     #DVT ppx  -  Heparin, SCD, TEDs    #GI ppx  - Pepcid 20mg    #Bowel Regimen  - Senna DCd  - Miralax BID PRN 2/6  - Bowel regimen adjustment per hospitalist 2/6    #Bladder management  - BS on admission, and q 8 hours (SC if > 400)  - Monitor UO    #FEN   - Diet: Regular. Cardiac  - Supplements vit D, calcium    #Skin:  - Skin on admission: Thoracic spine incision + 2 drain site with surgiglue, Right flank incision with surgiglue BRANDI  - Pressure injury/Skin: Turn Q2hrs while in bed, OOB to Chair, PT/OT     #Sleep:   - Maintain quiet hours and low stim environment.  - Melatonin Dcd on 2/2  -- Tizanidine 2 mg @ HS    #Precaution  - Fall, Aspiration, cardiac, thoracic spine    IDT conference on 2/2  TDD: 2/10 to home  Barriers: Pain  Social Work: Lives in  with 3STE with 1st FL setup. Independent PTA.  OT: Min A/CGA for ADLs and transfers. Mod A for shower transfer.  PT: Min A with RW for transfers. Ambulated 50 ft with RW with min A. Negotiated 8 (6-inch) stairs with 2 HRs.   SLP: --   ----------------------------------------------------------------  Outpatient Follow-up (Specialty/Name of physician):  PCP: Dr. Tamela Mckeon MD  Plastic Surgery  [address] [place] [address]  [address]  [place]. [address] [phone]    Leo Smith MD  Neurosurgery  [address] [place] [address]  [address]  [place]. [address]  [phone]  ----------------------------------------------------------------

## 2023-02-06 NOTE — DISCHARGE NOTE PROVIDER - NSDCCPCAREPLAN_GEN_ALL_CORE_FT
PRINCIPAL DISCHARGE DIAGNOSIS  Diagnosis: Intervertebral disc disorder with radiculopathy of lumbar region  Assessment and Plan of Treatment: You underwent a R posterior spine L3-5 exteme lateral lumbar interbody fusion on 1/24. You have met your acute rehab goals and will resume rehabilitation therapies outpatient. Please follow up with your surgeon for further management.      SECONDARY DISCHARGE DIAGNOSES  Diagnosis: Diabetes mellitus, type 2  Assessment and Plan of Treatment: Please continue to take your Diabetes medications as prescribed. Please follow up with your PCP/Endocrinologist for further management.    Diagnosis: Hypertension  Assessment and Plan of Treatment: Please continue to take your antihypertensive medications as prescribed. Please follow up with your PCP/Cardiologist for further management.

## 2023-02-06 NOTE — DISCHARGE NOTE PROVIDER - CARE PROVIDERS DIRECT ADDRESSES
,[email or web address],DirectAddress_Unknown ,DirectAddress_Unknown,DirectAddress_Unknown

## 2023-02-07 PROCEDURE — 99232 SBSQ HOSP IP/OBS MODERATE 35: CPT

## 2023-02-07 RX ORDER — OXYCODONE HYDROCHLORIDE 5 MG/1
5 TABLET ORAL EVERY 6 HOURS
Refills: 0 | Status: DISCONTINUED | OUTPATIENT
Start: 2023-02-07 | End: 2023-02-10

## 2023-02-07 RX ADMIN — Medication 25 MILLIGRAM(S): at 17:04

## 2023-02-07 RX ADMIN — LIDOCAINE 1 PATCH: 4 CREAM TOPICAL at 05:37

## 2023-02-07 RX ADMIN — METFORMIN HYDROCHLORIDE 1000 MILLIGRAM(S): 850 TABLET ORAL at 07:47

## 2023-02-07 RX ADMIN — HEPARIN SODIUM 5000 UNIT(S): 5000 INJECTION INTRAVENOUS; SUBCUTANEOUS at 05:37

## 2023-02-07 RX ADMIN — Medication 650 MILLIGRAM(S): at 12:45

## 2023-02-07 RX ADMIN — TAMSULOSIN HYDROCHLORIDE 0.4 MILLIGRAM(S): 0.4 CAPSULE ORAL at 19:46

## 2023-02-07 RX ADMIN — LIDOCAINE 1 PATCH: 4 CREAM TOPICAL at 07:46

## 2023-02-07 RX ADMIN — LISINOPRIL 10 MILLIGRAM(S): 2.5 TABLET ORAL at 05:37

## 2023-02-07 RX ADMIN — TIZANIDINE 2 MILLIGRAM(S): 4 TABLET ORAL at 19:46

## 2023-02-07 RX ADMIN — Medication 25 MILLIGRAM(S): at 05:37

## 2023-02-07 RX ADMIN — LIDOCAINE 1 PATCH: 4 CREAM TOPICAL at 17:08

## 2023-02-07 RX ADMIN — OXYCODONE HYDROCHLORIDE 5 MILLIGRAM(S): 5 TABLET ORAL at 19:46

## 2023-02-07 RX ADMIN — FAMOTIDINE 20 MILLIGRAM(S): 10 INJECTION INTRAVENOUS at 11:39

## 2023-02-07 RX ADMIN — METFORMIN HYDROCHLORIDE 1000 MILLIGRAM(S): 850 TABLET ORAL at 17:02

## 2023-02-07 RX ADMIN — OXYCODONE HYDROCHLORIDE 5 MILLIGRAM(S): 5 TABLET ORAL at 20:40

## 2023-02-07 RX ADMIN — Medication 650 MILLIGRAM(S): at 11:54

## 2023-02-07 RX ADMIN — ATORVASTATIN CALCIUM 80 MILLIGRAM(S): 80 TABLET, FILM COATED ORAL at 19:46

## 2023-02-07 RX ADMIN — HEPARIN SODIUM 5000 UNIT(S): 5000 INJECTION INTRAVENOUS; SUBCUTANEOUS at 17:03

## 2023-02-07 NOTE — PROGRESS NOTE ADULT - ASSESSMENT
86 y/o M with PMH of HTN, HLD, Nonobstructive CAD, prostate cancer s/p RT, T2DM, Arthritis s/p Right total knee replacement in 2008, squamous cell carcinoma of scalp, BPH, ASHD, Chronic lower back pain secondary to Lumbar stenosis with neurogenic claudication, COVID 2020 who presented to [facility] on 1/24 with c/o lower back pain radiating down to B/L legs that has progressively worsened and has failed conservation treatments. MRI of lumbar spine revealed  Convex left curvature with straightening of the lordosis, multilevel loss of disc signal and height and anterior spurring and bulging from L2-L3 through L5-S1. He is s/p right posterior spine L3-L5 extreme lateral lumbar interbody fusion, L2-S1 posterior column osteotomy, facetectomy, foraminotomy, L3-L5 posterior instrumented fusion paraspinous muscle flap wound closure on 1/24. Patient now with gait Instability, ADL impairments and Functional impairments.    #Lumbar Radiculopathy s/p L3-L5 extreme lateral lumbar interbody fusion, L2-S1 posterior column osteotomy, facetectomy, foraminotomy, L3-L5 posterior instrumented fusion paraspinous muscle flap wound closure on 1/24  -continue comprehensive rehab program   -Pain management, bowel regimen per rehab     # Anemia  - no active s/s bleeding, likely post op anemia   - continue to monitor    #HLD  -Continue Lipitor     #HTN  -Continue Lisinopril and Metoprolol    #DM II and hyperglycemia  -A1c 8.5  -D/C ISS, FS, and lantus  -Continue Metformin 1000 mg BID    #BPH  -Continue Flomax     #Transaminitis - resolved  -Could be due to tizanidine    #DVT ppx - HSQ  #GI ppx - Pepcid

## 2023-02-07 NOTE — PROGRESS NOTE ADULT - SUBJECTIVE AND OBJECTIVE BOX
CC: Lumbar Radiculopathy    Today's Subjective & Objective Findings:  Patient seen and examined at bedside this AM.  States that he slept well last night. Admits to much improvement since starting Tizanidine.  Last BM on 2/7, per patient.  Lidocaine patch, warm & cold compress helping with back pain.  Feels like back pain is improving overall.   No other complaints.    Denies headache, dizziness, visual changes, chest pain, SOB/LUNA, abdominal pain, nausea, vomiting, diarrhea, dysuria, numbness or tingling.    Therapy-- engaging, motivated.  Observed doing leg lifts with weights in PT yesterday.     Vital Signs Last 24 Hrs  T(C): 36.4 (06 Feb 2023 20:54), Max: 36.4 (06 Feb 2023 20:54)  T(F): 97.6 (06 Feb 2023 20:54), Max: 97.6 (06 Feb 2023 20:54)  HR: 75 (07 Feb 2023 05:40) (75 - 96)  BP: 139/83 (07 Feb 2023 05:40) (118/68 - 139/83)  BP(mean): --  RR: 16 (06 Feb 2023 20:54) (16 - 16)  SpO2: 94% (06 Feb 2023 20:54) (94% - 95%)    PHYSICAL EXAM:  Gen - NAD, Comfortable  HEENT - NCAT, EOMI, MMM  Neck - Supple, No limited ROM  Pulm - CTAB, No wheeze, No rhonchi, No crackles  Cardiovascular - RRR, S1S2  Chest - good chest expansion, good respiratory effort  Abdomen - Soft, NT/ND, +BS  Extremities - No Cyanosis, no clubbing, no edema, no calf tenderness  Neuro-      Cognitive - awake, alert, oriented to person, place, date, year, and situation.  Able to follow command     Communication - Fluent     Attention: Intact,      Judgement: Good evidence of judgement     Cranial Nerves - CN 2-12 intact.     Motor -                     LEFT    UE - 4+/5                    RIGHT UE - 4+/5                    LEFT    LE - 4-/5                    RIGHT LE - 3+/5        Sensory - Intact to LT      Reflexes - DTR Intact, No primitive reflexive  Skin:  thoracic spine incision + 2 drain site with surgiglue, Right flank incision with surgiglue BRANDI      LAB                        9.8    6.50  )-----------( 418      ( 06 Feb 2023 06:20 )             30.3     02-06    137  |  102  |  14  ----------------------------<  149<H>  3.9   |  29  |  0.92    Ca    8.7      06 Feb 2023 06:20    TPro  6.1  /  Alb  2.7<L>  /  TBili  0.8  /  DBili  x   /  AST  33  /  ALT  43  /  AlkPhos  119  02-06    LIVER FUNCTIONS - ( 06 Feb 2023 06:20 )  Alb: 2.7 g/dL / Pro: 6.1 g/dL / ALK PHOS: 119 U/L / ALT: 43 U/L / AST: 33 U/L / GGT: x             MEDICATIONS  (STANDING):  atorvastatin 80 milliGRAM(s) Oral at bedtime  cholecalciferol 2000 Unit(s) Oral <User Schedule>  dextrose 5%. 1000 milliLiter(s) (100 mL/Hr) IV Continuous <Continuous>  dextrose 5%. 1000 milliLiter(s) (50 mL/Hr) IV Continuous <Continuous>  dextrose 50% Injectable 25 Gram(s) IV Push once  dextrose 50% Injectable 12.5 Gram(s) IV Push once  dextrose 50% Injectable 25 Gram(s) IV Push once  famotidine    Tablet 20 milliGRAM(s) Oral daily  glucagon  Injectable 1 milliGRAM(s) IntraMuscular once  heparin   Injectable 5000 Unit(s) SubCutaneous every 12 hours  insulin lispro (ADMELOG) corrective regimen sliding scale   SubCutaneous two times a day before meals  lidocaine   4% Patch 1 Patch Transdermal <User Schedule>  lisinopril 10 milliGRAM(s) Oral daily  metFORMIN 1000 milliGRAM(s) Oral two times a day with meals  metoprolol tartrate 25 milliGRAM(s) Oral two times a day  tamsulosin 0.4 milliGRAM(s) Oral at bedtime  tiZANidine 2 milliGRAM(s) Oral at bedtime    MEDICATIONS  (PRN):  acetaminophen     Tablet .. 650 milliGRAM(s) Oral every 6 hours PRN Temp greater or equal to 38C (100.4F), Mild Pain (1 - 3)  dextrose Oral Gel 15 Gram(s) Oral once PRN Blood Glucose LESS THAN 70 milliGRAM(s)/deciliter  oxyCODONE    IR 5 milliGRAM(s) Oral every 6 hours PRN Moderate Pain (4 - 6)  polyethylene glycol 3350 17 Gram(s) Oral two times a day PRN Constipation

## 2023-02-07 NOTE — PROGRESS NOTE ADULT - SUBJECTIVE AND OBJECTIVE BOX
Patient is a 87y old  Male who presents with a chief complaint of Lumbar Radiculopathy (07 Feb 2023 09:00)      Patient seen and examined at bedside.  No overnight events  No complaints this morning    ALLERGIES:  clindamycin (Unknown)  gentamicin (Unknown)    MEDICATIONS  (STANDING):  atorvastatin 80 milliGRAM(s) Oral at bedtime  cholecalciferol 2000 Unit(s) Oral <User Schedule>  dextrose 5%. 1000 milliLiter(s) (100 mL/Hr) IV Continuous <Continuous>  dextrose 5%. 1000 milliLiter(s) (50 mL/Hr) IV Continuous <Continuous>  dextrose 50% Injectable 25 Gram(s) IV Push once  dextrose 50% Injectable 12.5 Gram(s) IV Push once  dextrose 50% Injectable 25 Gram(s) IV Push once  famotidine    Tablet 20 milliGRAM(s) Oral daily  glucagon  Injectable 1 milliGRAM(s) IntraMuscular once  heparin   Injectable 5000 Unit(s) SubCutaneous every 12 hours  lidocaine   4% Patch 1 Patch Transdermal <User Schedule>  lisinopril 10 milliGRAM(s) Oral daily  metFORMIN 1000 milliGRAM(s) Oral two times a day with meals  metoprolol tartrate 25 milliGRAM(s) Oral two times a day  tamsulosin 0.4 milliGRAM(s) Oral at bedtime  tiZANidine 2 milliGRAM(s) Oral at bedtime    MEDICATIONS  (PRN):  acetaminophen     Tablet .. 650 milliGRAM(s) Oral every 6 hours PRN Temp greater or equal to 38C (100.4F), Mild Pain (1 - 3)  dextrose Oral Gel 15 Gram(s) Oral once PRN Blood Glucose LESS THAN 70 milliGRAM(s)/deciliter  oxyCODONE    IR 5 milliGRAM(s) Oral every 6 hours PRN Moderate Pain (4 - 6)  polyethylene glycol 3350 17 Gram(s) Oral two times a day PRN Constipation    Vital Signs Last 24 Hrs  T(F): 97.6 (06 Feb 2023 20:54), Max: 97.6 (06 Feb 2023 20:54)  HR: 75 (07 Feb 2023 05:40) (75 - 96)  BP: 139/83 (07 Feb 2023 05:40) (118/68 - 139/83)  RR: 16 (06 Feb 2023 20:54) (16 - 16)  SpO2: 94% (06 Feb 2023 20:54) (94% - 95%)  I&O's Summary    06 Feb 2023 07:01  -  07 Feb 2023 07:00  --------------------------------------------------------  IN: 0 mL / OUT: 400 mL / NET: -400 mL    PHYSICAL EXAM:  GENERAL: NAD  HENT:  Atraumatic, Normocephalic; No tonsillar erythema, exudates, or enlargement; Moist mucous membranes;   EYES: EOMI, PERRLA, conjunctiva and sclera clear, no lid-lag  NECK: Supple, No JVD, Normal thyroid  CHEST/LUNG: Clear to percussion bilaterally; No rales, rhonchi, wheezing, or rubs; normal respiratory effort, no intercostal retractions  HEART: Regular rate and rhythm; No murmurs, rubs, or gallops; No pitting edema  ABDOMEN: Soft, Nontender, Nondistended; Bowel sounds present; No HSM  MUSCULOSKELETAL/EXTREMITIES:  2+ Peripheral Pulses, No clubbing or digital cyanosis  PSYCH: Appropriate affect, Alert & Awake; Good judgement    LABS:                        9.8    6.50  )-----------( 418      ( 06 Feb 2023 06:20 )             30.3       02-06    137  |  102  |  14  ----------------------------<  149  3.9   |  29  |  0.92    Ca    8.7      06 Feb 2023 06:20    TPro  6.1  /  Alb  2.7  /  TBili  0.8  /  DBili  x   /  AST  33  /  ALT  43  /  AlkPhos  119  02-06     COVID-19 PCR: Cecilio (01-27-23 @ 19:06)    Care Discussed with Rehab Attending and Other Providers

## 2023-02-08 PROCEDURE — 99232 SBSQ HOSP IP/OBS MODERATE 35: CPT

## 2023-02-08 RX ADMIN — ATORVASTATIN CALCIUM 80 MILLIGRAM(S): 80 TABLET, FILM COATED ORAL at 20:27

## 2023-02-08 RX ADMIN — LISINOPRIL 10 MILLIGRAM(S): 2.5 TABLET ORAL at 05:37

## 2023-02-08 RX ADMIN — METFORMIN HYDROCHLORIDE 1000 MILLIGRAM(S): 850 TABLET ORAL at 08:12

## 2023-02-08 RX ADMIN — HEPARIN SODIUM 5000 UNIT(S): 5000 INJECTION INTRAVENOUS; SUBCUTANEOUS at 17:24

## 2023-02-08 RX ADMIN — FAMOTIDINE 20 MILLIGRAM(S): 10 INJECTION INTRAVENOUS at 11:31

## 2023-02-08 RX ADMIN — LIDOCAINE 1 PATCH: 4 CREAM TOPICAL at 17:27

## 2023-02-08 RX ADMIN — LIDOCAINE 1 PATCH: 4 CREAM TOPICAL at 05:37

## 2023-02-08 RX ADMIN — Medication 650 MILLIGRAM(S): at 16:07

## 2023-02-08 RX ADMIN — Medication 25 MILLIGRAM(S): at 17:24

## 2023-02-08 RX ADMIN — OXYCODONE HYDROCHLORIDE 5 MILLIGRAM(S): 5 TABLET ORAL at 20:27

## 2023-02-08 RX ADMIN — METFORMIN HYDROCHLORIDE 1000 MILLIGRAM(S): 850 TABLET ORAL at 17:24

## 2023-02-08 RX ADMIN — TIZANIDINE 2 MILLIGRAM(S): 4 TABLET ORAL at 20:27

## 2023-02-08 RX ADMIN — Medication 2000 UNIT(S): at 08:12

## 2023-02-08 RX ADMIN — HEPARIN SODIUM 5000 UNIT(S): 5000 INJECTION INTRAVENOUS; SUBCUTANEOUS at 05:37

## 2023-02-08 RX ADMIN — OXYCODONE HYDROCHLORIDE 5 MILLIGRAM(S): 5 TABLET ORAL at 21:20

## 2023-02-08 RX ADMIN — Medication 25 MILLIGRAM(S): at 05:37

## 2023-02-08 RX ADMIN — Medication 650 MILLIGRAM(S): at 15:07

## 2023-02-08 RX ADMIN — TAMSULOSIN HYDROCHLORIDE 0.4 MILLIGRAM(S): 0.4 CAPSULE ORAL at 20:27

## 2023-02-08 RX ADMIN — LIDOCAINE 1 PATCH: 4 CREAM TOPICAL at 08:11

## 2023-02-08 NOTE — PROGRESS NOTE ADULT - SUBJECTIVE AND OBJECTIVE BOX
CC: Lumbar Radiculopathy    Today's Subjective & Objective Findings:  ******      Patient seen and examined at bedside this AM.  States that he slept well last night. Admits to much improvement since starting Tizanidine.  Last BM on 2/7, per patient.  Lidocaine patch, warm & cold compress helping with back pain.  Feels like back pain is improving overall.   No other complaints.    Denies headache, dizziness, visual changes, chest pain, SOB/LUNA, abdominal pain, nausea, vomiting, diarrhea, dysuria, numbness or tingling.    Therapy-- engaging, motivated.  Observed doing leg lifts with weights in PT yesterday.     Vital Signs Last 24 Hrs  T(C): 36.7 (08 Feb 2023 08:06), Max: 36.7 (08 Feb 2023 08:06)  T(F): 98.1 (08 Feb 2023 08:06), Max: 98.1 (08 Feb 2023 08:06)  HR: 91 (08 Feb 2023 08:06) (90 - 94)  BP: 102/68 (08 Feb 2023 08:06) (102/68 - 126/77)  BP(mean): --  RR: 16 (08 Feb 2023 08:06) (16 - 16)  SpO2: 95% (08 Feb 2023 08:06) (94% - 95%)    PHYSICAL EXAM:  Gen - NAD, Comfortable  HEENT - NCAT, EOMI, MMM  Neck - Supple, No limited ROM  Pulm - CTAB, No wheeze, No rhonchi, No crackles  Cardiovascular - RRR, S1S2  Chest - good chest expansion, good respiratory effort  Abdomen - Soft, NT/ND, +BS  Extremities - No Cyanosis, no clubbing, no edema, no calf tenderness  Neuro-      Cognitive - awake, alert, oriented to person, place, date, year, and situation.  Able to follow command     Communication - Fluent     Attention: Intact,      Judgement: Good evidence of judgement     Cranial Nerves - CN 2-12 intact.     Motor -                     LEFT    UE - 4+/5                    RIGHT UE - 4+/5                    LEFT    LE - 4-/5                    RIGHT LE - 3+/5        Sensory - Intact to LT      Reflexes - DTR Intact, No primitive reflexive  Skin:  thoracic spine incision + 2 drain site with surgiglue, Right flank incision with surgiglue BRANDI      LAB                        9.8    6.50  )-----------( 418      ( 06 Feb 2023 06:20 )             30.3     02-06    137  |  102  |  14  ----------------------------<  149<H>  3.9   |  29  |  0.92    Ca    8.7      06 Feb 2023 06:20    TPro  6.1  /  Alb  2.7<L>  /  TBili  0.8  /  DBili  x   /  AST  33  /  ALT  43  /  AlkPhos  119  02-06    LIVER FUNCTIONS - ( 06 Feb 2023 06:20 )  Alb: 2.7 g/dL / Pro: 6.1 g/dL / ALK PHOS: 119 U/L / ALT: 43 U/L / AST: 33 U/L / GGT: x             MEDICATIONS  (STANDING):  atorvastatin 80 milliGRAM(s) Oral at bedtime  cholecalciferol 2000 Unit(s) Oral <User Schedule>  dextrose 5%. 1000 milliLiter(s) (100 mL/Hr) IV Continuous <Continuous>  dextrose 5%. 1000 milliLiter(s) (50 mL/Hr) IV Continuous <Continuous>  dextrose 50% Injectable 25 Gram(s) IV Push once  dextrose 50% Injectable 12.5 Gram(s) IV Push once  dextrose 50% Injectable 25 Gram(s) IV Push once  famotidine    Tablet 20 milliGRAM(s) Oral daily  glucagon  Injectable 1 milliGRAM(s) IntraMuscular once  heparin   Injectable 5000 Unit(s) SubCutaneous every 12 hours  insulin lispro (ADMELOG) corrective regimen sliding scale   SubCutaneous two times a day before meals  lidocaine   4% Patch 1 Patch Transdermal <User Schedule>  lisinopril 10 milliGRAM(s) Oral daily  metFORMIN 1000 milliGRAM(s) Oral two times a day with meals  metoprolol tartrate 25 milliGRAM(s) Oral two times a day  tamsulosin 0.4 milliGRAM(s) Oral at bedtime  tiZANidine 2 milliGRAM(s) Oral at bedtime    MEDICATIONS  (PRN):  acetaminophen     Tablet .. 650 milliGRAM(s) Oral every 6 hours PRN Temp greater or equal to 38C (100.4F), Mild Pain (1 - 3)  dextrose Oral Gel 15 Gram(s) Oral once PRN Blood Glucose LESS THAN 70 milliGRAM(s)/deciliter  oxyCODONE    IR 5 milliGRAM(s) Oral every 6 hours PRN Moderate Pain (4 - 6)  polyethylene glycol 3350 17 Gram(s) Oral two times a day PRN Constipation   CC: Lumbar Radiculopathy    Today's Subjective & Objective Findings:  Patient seen and examined at bedside this AM, with NP  Her reports reduced intensity of spasm over back muscles with Tizanidine    ROS  Denies headache, dizziness, visual changes, chest pain, SOB/LUNA, abdominal pain, nausea, vomiting, diarrhea, dysuria, numbness or tingling.  LBP 2/8    Therapy--Motivated and engaging  Muscle spasms reduced  ROM improved on all extremitiews      Vital Signs Last 24 Hrs  T(C): 36.7 (08 Feb 2023 08:06), Max: 36.7 (08 Feb 2023 08:06)  T(F): 98.1 (08 Feb 2023 08:06), Max: 98.1 (08 Feb 2023 08:06)  HR: 91 (08 Feb 2023 08:06) (90 - 94)  BP: 102/68 (08 Feb 2023 08:06) (102/68 - 126/77)  BP(mean): --  RR: 16 (08 Feb 2023 08:06) (16 - 16)  SpO2: 95% (08 Feb 2023 08:06) (94% - 95%)    PHYSICAL EXAM:  Gen - Comfortable  HEENT - NCAT, EOMI, MMM  Neck - Supple, No limited ROM  Pulm - CTAB, No wheeze, No rhonchi, No crackles  Cardiovascular - RRR, S1S2  Chest - good chest expansion, good respiratory effort  Abdomen - Soft, NT/ND, +BS  Extremities - No Cyanosis, no clubbing, no edema, no calf tenderness    Neuro-      Cognitive - awake, alert, oriented to person, place, date, year, and situation.  Able to follow command     Communication - Fluent     Attention: Intact,      Judgement: Good evidence of judgement     Cranial Nerves - CN 2-12 intact.     Motor -                     LEFT    UE - 4+/5                    RIGHT UE - 4+/5                    LEFT    LE - 4-/5                    RIGHT LE - 3+/5        Sensory - Intact to LT      Reflexes - DTR Intact, No primitive reflexive  Skin:  thoracic spine incision/scar unremarkable, Right lower flank surgical area, unremarkable, skin edges together      LAB                        9.8    6.50  )-----------( 418      ( 06 Feb 2023 06:20 )             30.3     02-06    137  |  102  |  14  ----------------------------<  149<H>  3.9   |  29  |  0.92    Ca    8.7      06 Feb 2023 06:20    TPro  6.1  /  Alb  2.7<L>  /  TBili  0.8  /  DBili  x   /  AST  33  /  ALT  43  /  AlkPhos  119  02-06    LIVER FUNCTIONS - ( 06 Feb 2023 06:20 )  Alb: 2.7 g/dL / Pro: 6.1 g/dL / ALK PHOS: 119 U/L / ALT: 43 U/L / AST: 33 U/L / GGT: x             MEDICATIONS  (STANDING):  atorvastatin 80 milliGRAM(s) Oral at bedtime  cholecalciferol 2000 Unit(s) Oral <User Schedule>  dextrose 5%. 1000 milliLiter(s) (100 mL/Hr) IV Continuous <Continuous>  dextrose 5%. 1000 milliLiter(s) (50 mL/Hr) IV Continuous <Continuous>  dextrose 50% Injectable 25 Gram(s) IV Push once  dextrose 50% Injectable 12.5 Gram(s) IV Push once  dextrose 50% Injectable 25 Gram(s) IV Push once  famotidine    Tablet 20 milliGRAM(s) Oral daily  glucagon  Injectable 1 milliGRAM(s) IntraMuscular once  heparin   Injectable 5000 Unit(s) SubCutaneous every 12 hours  insulin lispro (ADMELOG) corrective regimen sliding scale   SubCutaneous two times a day before meals  lidocaine   4% Patch 1 Patch Transdermal <User Schedule>  lisinopril 10 milliGRAM(s) Oral daily  metFORMIN 1000 milliGRAM(s) Oral two times a day with meals  metoprolol tartrate 25 milliGRAM(s) Oral two times a day  tamsulosin 0.4 milliGRAM(s) Oral at bedtime  tiZANidine 2 milliGRAM(s) Oral at bedtime    MEDICATIONS  (PRN):  acetaminophen     Tablet .. 650 milliGRAM(s) Oral every 6 hours PRN Temp greater or equal to 38C (100.4F), Mild Pain (1 - 3)  dextrose Oral Gel 15 Gram(s) Oral once PRN Blood Glucose LESS THAN 70 milliGRAM(s)/deciliter  oxyCODONE    IR 5 milliGRAM(s) Oral every 6 hours PRN Moderate Pain (4 - 6)  polyethylene glycol 3350 17 Gram(s) Oral two times a day PRN Constipation

## 2023-02-08 NOTE — PROGRESS NOTE ADULT - ASSESSMENT
86 YO male with PMH of HTN, HLD,  Nonobstructive CAD, prostate cancer s/p RT,  DM II, Arthritis s/p Right total knee replacement in 2008, squamous cell carcinoma of scalp, BPH, ASHD, Chronic lower back pain secondary to Lumbar stenosis with neurogenic claudication, COVID 2020 who presented to [facility] on 1/24 with c/o  lower back pain radiating down to B/L legs that has  progressively worsened and has failed conservation treatments.    MRI of lumbar spine revealed  Convex left curvature with straightening of the lordosis, multilevel loss of disc signal and height and anterior spurring and bulging from L2-L3 through L5-S1. He is s/p right posterior spine  L3-L5 extreme lateral lumbar interbody fusion, L2-S1 posterior column osteotomy, facetectomy, foraminotomy, L3-L5 posterior instrumented fusion  paraspineous muscle flap wound closure on 1/24.  Patient now with gait Instability, ADL impairments and Functional impairments.    * Pain management- warm/cold compress- Lidocaine patch * Tizanidine 2 mg @ HS * Continue to monitor functional progress * Bowel regimen adjusted * Plan for DC 2/10 *     #Lumbar Radiculopathy  - MRI of lumbar spine revealed  Convex left curvature with straightening of the lordosis, multilevel loss of disc signal and height and anterior spurring and bulging from L2-L3 through L5-S1.   - now s/p   L3-L5 extreme lateral lumbar interbody fusion, L2-S1 posterior column osteotomy, facetectomy, foraminotomy, L3-L5 posterior instrumented fusion  paraspineous muscle flap wound closure on 1/24.  - Surgical site with dermabond- well approximated  - Start Comprehensive Rehab Program: PT/OT 3hours daily and 5 days weekly  - PT: Focused on improving strength, endurance, coordination, balance, functional mobility, and transfers  - OT: Focused on improving strength, fine motor skills, coordination, posture and ADLs.      #HLD  - Lipitor 80mg daily    #HTN  - Lisinopril  10mg daily  - Metoprolol 25mg BID    #DM II  - ISS and FS  - Admelog and Lantus    #BPH  - Flomax 0.4mg daily    #Pain management  - Tylenol PRN, Oxycodone PRN, Zanaflex 2mg prn  - Warm or cold compress  - Tizanidine 2 mg @ HS   - Lidocaine patch daily     #DVT ppx  -  Heparin, SCD, TEDs    #GI ppx  - Pepcid 20mg    #Bowel Regimen  - Senna DCd  - Miralax BID PRN 2/6  - Bowel regimen adjustment per hospitalist 2/6    #Bladder management  - BS on admission, and q 8 hours (SC if > 400)  - Monitor UO    #FEN   - Diet: Regular. Cardiac  - Supplements vit D, calcium    #Skin:  - Skin on admission: Thoracic spine incision + 2 drain site with surgiglue, Right flank incision with surgiglue BRANDI  - Pressure injury/Skin: Turn Q2hrs while in bed, OOB to Chair, PT/OT     #Sleep:   - Maintain quiet hours and low stim environment.  - Melatonin Dcd on 2/2  -- Tizanidine 2 mg @ HS    #Precaution  - Fall, Aspiration, cardiac, thoracic spine    IDT conference on 2/2  TDD: 2/10 to home  Barriers: Pain  Social Work: Lives in  with [employer] with 1st FL setup. Independent PTA.  OT: Min A/CGA for ADLs and transfers. Mod A for shower transfer.  PT: Min A with RW for transfers. Ambulated 50 ft with RW with min A. Negotiated 8 (6-inch) stairs with 2 HRs.   SLP: --   ----------------------------------------------------------------  Outpatient Follow-up (Specialty/Name of physician):  PCP: Dr. Tamela Mckeon MD  Plastic Surgery  [address] [place] [address]  [address]  [place]. [id number] [phone]    Leo Smith MD  Neurosurgery  [address] [place] [address]  [address]  [place]. [address]  [phone]  ----------------------------------------------------------------   88 y/o male with PMH of HTN, HLD,  Nonobstructive CAD, prostate cancer s/p RT,  DM II, Arthritis s/p Right total knee replacement in 2008, squamous cell carcinoma of scalp, BPH, ASHD, Chronic lower back pain secondary to Lumbar stenosis with neurogenic claudication, COVID 2020 who presented to [facility] on 1/24 with c/o  lower back pain radiating down to B/L legs that has  progressively worsened and has failed conservation treatments.    MRI of lumbar spine revealed  Convex left curvature with straightening of the lordosis, multilevel loss of disc signal and height and anterior spurring and bulging from L2-L3 through L5-S1. He is s/p right posterior spine  L3-L5 extreme lateral lumbar interbody fusion, L2-S1 posterior column osteotomy, facetectomy, foraminotomy, L3-L5 posterior instrumented fusion  paraspineous muscle flap wound closure on 1/24.     * Plan for DC 2/10 *   * Sustained functional improvement, improved pain control, DC planning, spasms reduced     #Lumbar Radiculopathy  - MRI of lumbar spine revealed  Convex left curvature with straightening of the lordosis, multilevel loss of disc signal and height and anterior spurring and bulging from L2-L3 through L5-S1.   - now s/p   L3-L5 extreme lateral lumbar interbody fusion, L2-S1 posterior column osteotomy, facetectomy, foraminotomy, L3-L5 posterior instrumented fusion  paraspineous muscle flap wound closure on 1/24.  - Surgical site with dermabond- well approximated  --Continue PT/OT    #HLD  - Lipitor 80mg daily    #HTN  - Lisinopril  10mg daily  - Metoprolol 25mg BID    #DM II  - ISS and FS  - Admelog and Lantus    #BPH  - Flomax 0.4mg daily    #Pain management  - Tylenol PRN, Oxycodone PRN, Zanaflex 2mg prn  - Warm or cold compress  - Tizanidine 2 mg @ HS   - Lidocaine patch daily     #DVT ppx  -  Heparin, SCD, TEDs    #GI ppx  - Pepcid 20mg    #Bowel Regimen  - Senna DCd  - Miralax BID PRN 2/6  - Bowel regimen adjustment per hospitalist 2/6    #Bladder management  --Voiding appropriately    #FEN   - Diet: Regular. Cardiac  - Supplements vit D, calcium    #Skin:  - Skin Thoracic spine incision + 2 drain site with surgiglue,, healing Right flank incision with surgiglue BRANDI    #Sleep:   - Maintain quiet hours and low stim environment.  - Melatonin Dcd on 2/2  -- Tizanidine 2 mg @ HS    #Precaution  - Fall, Aspiration, cardiac, thoracic spine    IDT conference on 2/2  TDD: 2/10 to home  Barriers: Pain  Social Work: Lives in  with [employer] with 1st FL setup. Independent PTA.  OT: Min A/CGA for ADLs and transfers. Mod A for shower transfer.  PT: Min A with RW for transfers. Ambulated 50 ft with RW with min A. Negotiated 8 (6-inch) stairs with 2 HRs.   SLP: --   ----------------------------------------------------------------  Outpatient Follow-up (Specialty/Name of physician):  PCP: Dr. Tamela Mckeon MD  Plastic Surgery  [address] [place] [address]  [address] [place] [address]. [id number] [phone]    Leo Smith MD  Neurosurgery  [address] [place] [address]  [address]  [place]. [address]  [phone]  ----------------------------------------------------------------

## 2023-02-09 ENCOUNTER — TRANSCRIPTION ENCOUNTER (OUTPATIENT)
Age: 88
End: 2023-02-09

## 2023-02-09 LAB
ALBUMIN SERPL ELPH-MCNC: 2.9 G/DL — LOW (ref 3.3–5)
ALP SERPL-CCNC: 121 U/L — HIGH (ref 40–120)
ALT FLD-CCNC: 36 U/L — SIGNIFICANT CHANGE UP (ref 10–45)
ANION GAP SERPL CALC-SCNC: 10 MMOL/L — SIGNIFICANT CHANGE UP (ref 5–17)
AST SERPL-CCNC: 31 U/L — SIGNIFICANT CHANGE UP (ref 10–40)
BILIRUB SERPL-MCNC: 0.8 MG/DL — SIGNIFICANT CHANGE UP (ref 0.2–1.2)
BUN SERPL-MCNC: 16 MG/DL — SIGNIFICANT CHANGE UP (ref 7–23)
CALCIUM SERPL-MCNC: 9.1 MG/DL — SIGNIFICANT CHANGE UP (ref 8.4–10.5)
CHLORIDE SERPL-SCNC: 101 MMOL/L — SIGNIFICANT CHANGE UP (ref 96–108)
CO2 SERPL-SCNC: 29 MMOL/L — SIGNIFICANT CHANGE UP (ref 22–31)
CREAT SERPL-MCNC: 0.82 MG/DL — SIGNIFICANT CHANGE UP (ref 0.5–1.3)
EGFR: 86 ML/MIN/1.73M2 — SIGNIFICANT CHANGE UP
GLUCOSE SERPL-MCNC: 132 MG/DL — HIGH (ref 70–99)
HCT VFR BLD CALC: 31.6 % — LOW (ref 39–50)
HGB BLD-MCNC: 10.1 G/DL — LOW (ref 13–17)
MCHC RBC-ENTMCNC: 30.4 PG — SIGNIFICANT CHANGE UP (ref 27–34)
MCHC RBC-ENTMCNC: 32 GM/DL — SIGNIFICANT CHANGE UP (ref 32–36)
MCV RBC AUTO: 95.2 FL — SIGNIFICANT CHANGE UP (ref 80–100)
NRBC # BLD: 0 /100 WBCS — SIGNIFICANT CHANGE UP (ref 0–0)
PLATELET # BLD AUTO: 475 K/UL — HIGH (ref 150–400)
POTASSIUM SERPL-MCNC: 4.2 MMOL/L — SIGNIFICANT CHANGE UP (ref 3.5–5.3)
POTASSIUM SERPL-SCNC: 4.2 MMOL/L — SIGNIFICANT CHANGE UP (ref 3.5–5.3)
PROT SERPL-MCNC: 6.3 G/DL — SIGNIFICANT CHANGE UP (ref 6–8.3)
RBC # BLD: 3.32 M/UL — LOW (ref 4.2–5.8)
RBC # FLD: 16.2 % — HIGH (ref 10.3–14.5)
SODIUM SERPL-SCNC: 140 MMOL/L — SIGNIFICANT CHANGE UP (ref 135–145)
WBC # BLD: 4.77 K/UL — SIGNIFICANT CHANGE UP (ref 3.8–10.5)
WBC # FLD AUTO: 4.77 K/UL — SIGNIFICANT CHANGE UP (ref 3.8–10.5)

## 2023-02-09 PROCEDURE — 99232 SBSQ HOSP IP/OBS MODERATE 35: CPT

## 2023-02-09 PROCEDURE — 99233 SBSQ HOSP IP/OBS HIGH 50: CPT

## 2023-02-09 RX ORDER — METOPROLOL TARTRATE 50 MG
12.5 TABLET ORAL
Refills: 0 | Status: DISCONTINUED | OUTPATIENT
Start: 2023-02-09 | End: 2023-02-10

## 2023-02-09 RX ORDER — TIZANIDINE 4 MG/1
1 TABLET ORAL
Qty: 14 | Refills: 0
Start: 2023-02-09 | End: 2023-02-22

## 2023-02-09 RX ORDER — OXYCODONE HYDROCHLORIDE 5 MG/1
1 TABLET ORAL
Qty: 14 | Refills: 0
Start: 2023-02-09 | End: 2023-02-15

## 2023-02-09 RX ADMIN — METFORMIN HYDROCHLORIDE 1000 MILLIGRAM(S): 850 TABLET ORAL at 17:13

## 2023-02-09 RX ADMIN — LIDOCAINE 1 PATCH: 4 CREAM TOPICAL at 07:20

## 2023-02-09 RX ADMIN — LISINOPRIL 10 MILLIGRAM(S): 2.5 TABLET ORAL at 05:40

## 2023-02-09 RX ADMIN — TIZANIDINE 2 MILLIGRAM(S): 4 TABLET ORAL at 20:23

## 2023-02-09 RX ADMIN — Medication 650 MILLIGRAM(S): at 08:32

## 2023-02-09 RX ADMIN — METFORMIN HYDROCHLORIDE 1000 MILLIGRAM(S): 850 TABLET ORAL at 07:40

## 2023-02-09 RX ADMIN — Medication 650 MILLIGRAM(S): at 07:40

## 2023-02-09 RX ADMIN — HEPARIN SODIUM 5000 UNIT(S): 5000 INJECTION INTRAVENOUS; SUBCUTANEOUS at 17:14

## 2023-02-09 RX ADMIN — FAMOTIDINE 20 MILLIGRAM(S): 10 INJECTION INTRAVENOUS at 11:34

## 2023-02-09 RX ADMIN — ATORVASTATIN CALCIUM 80 MILLIGRAM(S): 80 TABLET, FILM COATED ORAL at 20:24

## 2023-02-09 RX ADMIN — HEPARIN SODIUM 5000 UNIT(S): 5000 INJECTION INTRAVENOUS; SUBCUTANEOUS at 05:40

## 2023-02-09 RX ADMIN — LIDOCAINE 1 PATCH: 4 CREAM TOPICAL at 17:12

## 2023-02-09 RX ADMIN — OXYCODONE HYDROCHLORIDE 5 MILLIGRAM(S): 5 TABLET ORAL at 21:24

## 2023-02-09 RX ADMIN — Medication 12.5 MILLIGRAM(S): at 17:14

## 2023-02-09 RX ADMIN — Medication 25 MILLIGRAM(S): at 05:40

## 2023-02-09 RX ADMIN — Medication 650 MILLIGRAM(S): at 16:02

## 2023-02-09 RX ADMIN — TAMSULOSIN HYDROCHLORIDE 0.4 MILLIGRAM(S): 0.4 CAPSULE ORAL at 20:24

## 2023-02-09 RX ADMIN — Medication 650 MILLIGRAM(S): at 16:55

## 2023-02-09 RX ADMIN — OXYCODONE HYDROCHLORIDE 5 MILLIGRAM(S): 5 TABLET ORAL at 20:24

## 2023-02-09 RX ADMIN — LIDOCAINE 1 PATCH: 4 CREAM TOPICAL at 05:40

## 2023-02-09 NOTE — PROGRESS NOTE ADULT - ASSESSMENT
86 y/o male with PMH of HTN, HLD,  Nonobstructive CAD, prostate cancer s/p RT,  DM II, Arthritis s/p Right total knee replacement in 2008, squamous cell carcinoma of scalp, BPH, ASHD, Chronic lower back pain secondary to Lumbar stenosis with neurogenic claudication, COVID 2020 who presented to [facility] on 1/24 with c/o  lower back pain radiating down to B/L legs that has  progressively worsened and has failed conservation treatments.    MRI of lumbar spine revealed  Convex left curvature with straightening of the lordosis, multilevel loss of disc signal and height and anterior spurring and bulging from L2-L3 through L5-S1. He is s/p right posterior spine  L3-L5 extreme lateral lumbar interbody fusion, L2-S1 posterior column osteotomy, facetectomy, foraminotomy, L3-L5 posterior instrumented fusion  paraspineous muscle flap wound closure on 1/24.     * Plan for DC 2/10 *   * Sustained functional improvement, improved pain control, DC planning, spasms reduced     #Lumbar Radiculopathy  - MRI of lumbar spine revealed  Convex left curvature with straightening of the lordosis, multilevel loss of disc signal and height and anterior spurring and bulging from L2-L3 through L5-S1.   - now s/p   L3-L5 extreme lateral lumbar interbody fusion, L2-S1 posterior column osteotomy, facetectomy, foraminotomy, L3-L5 posterior instrumented fusion  paraspineous muscle flap wound closure on 1/24.  - Surgical site with dermabond- well approximated  --Continue PT/OT    #HLD  - Lipitor 80mg daily    #HTN  - Lisinopril  10mg daily  - Metoprolol 25mg BID    #DM II  - ISS and FS  - Admelog and Lantus    #BPH  - Flomax 0.4mg daily    #Pain management  - Tylenol PRN, Oxycodone PRN, Zanaflex 2mg prn  - Warm or cold compress  - Tizanidine 2 mg @ HS   - Lidocaine patch daily     #DVT ppx  -  Heparin, SCD, TEDs    #GI ppx  - Pepcid 20mg    #Bowel Regimen  - Senna DCd  - Miralax BID PRN 2/6  - Bowel regimen adjustment per hospitalist 2/6    #Bladder management  --Voiding appropriately    #FEN   - Diet: Regular. Cardiac  - Supplements vit D, calcium    #Skin:  - Skin Thoracic spine incision + 2 drain site with surgiglue,, healing Right flank incision with surgiglue BRANDI    #Sleep:   - Maintain quiet hours and low stim environment.  - Melatonin Dcd on 2/2  -- Tizanidine 2 mg @ HS    #Precaution  - Fall, Aspiration, cardiac, thoracic spine    IDT conference on 2/2  TDD: 2/10 to home  Barriers: Pain  Social Work: Lives in  with [employer] with 1st FL setup. Independent PTA.  OT: Min A/CGA for ADLs and transfers. Mod A for shower transfer.  PT: Min A with RW for transfers. Ambulated 50 ft with RW with min A. Negotiated 8 (6-inch) stairs with 2 HRs.   SLP: --   ----------------------------------------------------------------  Outpatient Follow-up (Specialty/Name of physician):  PCP: Dr. Tamela Mckeon MD  Plastic Surgery  [address] [place] [address]  [address] [place] [address]. [id number] [phone]    Leo Smith MD  Neurosurgery  [address] [place] [address]  [address]  [place]. [address]  [phone]  ----------------------------------------------------------------   88 y/o male with PMH of HTN, HLD,  Nonobstructive CAD, prostate cancer s/p RT,  DM II, Arthritis s/p Right total knee replacement in 2008, squamous cell carcinoma of scalp, BPH, ASHD, Chronic lower back pain secondary to Lumbar stenosis with neurogenic claudication, COVID 2020 who presented to [facility] on 1/24 with c/o  lower back pain radiating down to B/L legs that has  progressively worsened and has failed conservation treatments.    MRI of lumbar spine revealed  Convex left curvature with straightening of the lordosis, multilevel loss of disc signal and height and anterior spurring and bulging from L2-L3 through L5-S1. He is s/p right posterior spine  L3-L5 extreme lateral lumbar interbody fusion, L2-S1 posterior column osteotomy, facetectomy, foraminotomy, L3-L5 posterior instrumented fusion  paraspineous muscle flap wound closure on 1/24.    * Plan for DC 2/10 * Sustained functional improvement, improved pain control, DC planning, spasms reduced *    #Lumbar Radiculopathy  - MRI of lumbar spine revealed  Convex left curvature with straightening of the lordosis, multilevel loss of disc signal and height and anterior spurring and bulging from L2-L3 through L5-S1.   - now s/p   L3-L5 extreme lateral lumbar interbody fusion, L2-S1 posterior column osteotomy, facetectomy, foraminotomy, L3-L5 posterior instrumented fusion  paraspineous muscle flap wound closure on 1/24.  - Surgical site with dermabond- well approximated  --Continue PT/OT    #HLD  - Lipitor 80mg daily    #HTN  - Lisinopril  10mg daily  - Metoprolol 25mg BID    #DM II  - ISS and FS  - Admelog and Lantus    #BPH  - Flomax 0.4mg daily    #Pain management  - Tylenol PRN, Oxycodone PRN, Zanaflex 2mg prn  - Warm or cold compress  - Tizanidine 2 mg @ HS   - Lidocaine patch daily     #DVT ppx  -  Heparin, SCD, TEDs    #GI ppx  - Pepcid 20mg    #Bowel Regimen  - Senna DCd  - Miralax BID PRN 2/6  - Bowel regimen adjustment per hospitalist 2/6    #Bladder management  --Voiding appropriately    #FEN   - Diet: Regular. Cardiac  - Supplements vit D, calcium    #Skin:  - Skin Thoracic spine incision + 2 drain site with surgiglue,, healing Right flank incision with surgiglue BRANDI    #Sleep:   - Maintain quiet hours and low stim environment.  - Melatonin Dcd on 2/2  -- Tizanidine 2 mg @ HS    #Precaution  - Fall, Aspiration, cardiac, thoracic spine    IDT conference on 2/2  TDD: 2/10 to home  Barriers: Pain  Social Work: Lives in  with [employer] with 1st FL setup. Independent PTA.  OT: Min A/CGA for ADLs and transfers. Mod A for shower transfer.  PT: Min A with RW for transfers. Ambulated 50 ft with RW with min A. Negotiated 8 (6-inch) stairs with 2 HRs.   SLP: --   ----------------------------------------------------------------  Outpatient Follow-up (Specialty/Name of physician):  PCP: Dr. Tamela Mckeon MD  Plastic Surgery  [address] [place] [address]  [address] [place] [address]. [id number] [phone]    Leo Smith MD  Neurosurgery  [address] [place] [address]  [address]  [place]. [address]  [phone]  ----------------------------------------------------------------   88 y/o male with PMH of HTN, HLD,  Nonobstructive CAD, prostate cancer s/p RT,  DM II, Arthritis s/p Right total knee replacement in 2008, squamous cell carcinoma of scalp, BPH, ASHD, Chronic lower back pain secondary to Lumbar stenosis with neurogenic claudication, COVID 2020 who presented to [facility] on 1/24 with c/o  lower back pain radiating down to B/L legs that has  progressively worsened and has failed conservation treatments.    MRI of lumbar spine revealed  Convex left curvature with straightening of the lordosis, multilevel loss of disc signal and height and anterior spurring and bulging from L2-L3 through L5-S1. He is s/p right posterior spine  L3-L5 extreme lateral lumbar interbody fusion, L2-S1 posterior column osteotomy, facetectomy, foraminotomy, L3-L5 posterior instrumented fusion  paraspineous muscle flap wound closure on 1/24.    * Plan for DC 2/10 * Sustained functional improvement, improved pain control, DC planning, spasms reduced *    #Lumbar Radiculopathy  - MRI of lumbar spine revealed  Convex left curvature with straightening of the lordosis, multilevel loss of disc signal and height and anterior spurring and bulging from L2-L3 through L5-S1.   - now s/p   L3-L5 extreme lateral lumbar interbody fusion, L2-S1 posterior column osteotomy, facetectomy, foraminotomy, L3-L5 posterior instrumented fusion  paraspineous muscle flap wound closure on 1/24.  - Surgical site with dermabond- well approximated  --Continue PT/OT    #HLD  - Lipitor 80mg daily    #HTN  - Lisinopril  10mg daily  - Metoprolol 25mg BID    #DM II  - ISS and FS  - Admelog and Lantus    #BPH  - Flomax 0.4mg daily    #Pain management  - Tylenol PRN, Oxycodone PRN, Zanaflex 2mg prn  - Warm or cold compress  - Tizanidine 2 mg @ HS   - Lidocaine patch daily     #DVT ppx  -  Heparin, SCD, TEDs    #GI ppx  - Pepcid 20mg    #Bowel Regimen  - Senna DCd  - Miralax BID PRN 2/6  - Bowel regimen adjustment per hospitalist 2/6    #Bladder management  --Voiding appropriately    #FEN   - Diet: Regular. Cardiac  - Supplements vit D, calcium    #Skin:  - Skin Thoracic spine incision + 2 drain site with surgiglue,, healing Right flank incision with surgiglue BRANDI    #Sleep:   - Maintain quiet hours and low stim environment.  - Melatonin Dcd on 2/2  -- Tizanidine 2 mg @ HS    #Precaution  - Fall, Aspiration, cardiac, thoracic spine    IDT conference on 2/9  TDD: 2/10 to home  Barriers: --  Social Work: Lives in  with [employer] with 1st FL setup. Independent PTA.  OT: Mod I/ supervision for dressing. Mod A for tub shower. Family aware.   PT: Mod I. Ready for home DC.   SLP: --   ----------------------------------------------------------------  Outpatient Follow-up (Specialty/Name of physician):  PCP: Dr. Tamela Mckeon MD  Plastic Surgery  [address] [place] [address]  [address]  [place]. [id number] [phone]    Leo Smith MD  Neurosurgery  [address] [place] [address]  [address]  [place]. [address]  [phone]  ----------------------------------------------------------------

## 2023-02-09 NOTE — PROGRESS NOTE ADULT - ASSESSMENT
86 y/o M with PMH of HTN, HLD, Nonobstructive CAD, prostate cancer s/p RT, T2DM, Arthritis s/p Right total knee replacement in 2008, squamous cell carcinoma of scalp, BPH, ASHD, Chronic lower back pain secondary to Lumbar stenosis with neurogenic claudication, COVID 2020 who presented to [facility] on 1/24 with c/o lower back pain radiating down to B/L legs that has progressively worsened and has failed conservation treatments. MRI of lumbar spine revealed  Convex left curvature with straightening of the lordosis, multilevel loss of disc signal and height and anterior spurring and bulging from L2-L3 through L5-S1. He is s/p right posterior spine L3-L5 extreme lateral lumbar interbody fusion, L2-S1 posterior column osteotomy, facetectomy, foraminotomy, L3-L5 posterior instrumented fusion paraspinous muscle flap wound closure on 1/24. Patient now with gait Instability, ADL impairments and Functional impairments.    #Lumbar Radiculopathy s/p L3-L5 extreme lateral lumbar interbody fusion, L2-S1 posterior column osteotomy, facetectomy, foraminotomy, L3-L5 posterior instrumented fusion paraspinous muscle flap wound closure on 1/24  -continue comprehensive rehab program   -Pain management, bowel regimen per rehab     # Anemia  - no active s/s bleeding, likely post op anemia   - continue to monitor    #HLD  -Continue Lipitor     #HTN  -Continue Lisinopril and Metoprolol    #DM II and hyperglycemia  -A1c 8.5  -D/C ISS, FS, and lantus  -Continue Metformin 1000 mg BID    #BPH  -Continue Flomax     #Transaminitis - resolved  -Could be due to tizanidine    #DVT ppx - HSQ  #GI ppx - Pepcid

## 2023-02-09 NOTE — PROGRESS NOTE ADULT - NS ATTEND OPT1 GEN_ALL_CORE
I independently performed the documented:
I attest my time as attending is greater than 50% of the total combined time spent on qualifying patient care activities by the PA/NP and attending.
I attest my time as attending is greater than 50% of the total combined time spent on qualifying patient care activities by the PA/NP and attending.
I independently performed the documented:

## 2023-02-09 NOTE — DISCHARGE NOTE NURSING/CASE MANAGEMENT/SOCIAL WORK - NSDCFUADDAPPT_GEN_ALL_CORE_FT
Please make an appointment with the following provider(s):    Leo Smith MD  Neurosurgery  [address]  [address]  [place]. [address] [phone]    Dr. Rapp (PCP)

## 2023-02-09 NOTE — PROGRESS NOTE ADULT - NS ATTEND AMEND GEN_ALL_CORE FT
Pt. seen this AM.  Agree with documentation above as per NP . Patient medically stable. Making progress towards rehab goals.       Lumbar Stenosis s/p L3-L5 PSF  Pain controlled,    Tolerating therapy and making progress  Bowels regular  Lab & VS reviewed-- Stable.
Pt. seen this AM.  Agree with documentation above as per NP . Patient medically stable. Making progress towards rehab goals.       Lumbar Stenosis s/p L3-L5 PSF  Pain controlled,    Tolerating therapy and making progress  Bowels regular  Lab & VS reviewed-- Stable.
Pt. seen with NP.  Agree with documentation above as per NP. Patient medically stable. Making progress towards rehab goals.     Pain controlled,  Participating in therapy.  constipated-- Enema PRN for BM
Seen and examined  Findings as noted, note revised    Sustained functional improvement  Spasms reduced  Pain controlled  Voiding appropriately  Antigravity all extremities  Lumbar spine surgical and drain sites, healing well    Continue PT/OT  Continue DVT ppx. pain control and minimal dozes of oxycodone  Monitor LFT while on tizanidine  DC planning
Seen and examined   Findings as noted  Note revised    No med complaint   Pain and spasms controlled  Requiring low dose opioids prn/od    Engaging in therapy    IDT details noted,     Continue PT/OT  DC tomorrow., home
Pt. seen with NP.  Agree with documentation above as per NP. Patient medically stable. Making progress towards rehab goals.

## 2023-02-09 NOTE — PROGRESS NOTE ADULT - SUBJECTIVE AND OBJECTIVE BOX
CC: Lumbar Radiculopathy    Today's Subjective & Objective Findings: ******  Patient seen and examined at bedside this AM, with NP  Her reports reduced intensity of spasm over back muscles with Tizanidine    ROS  Denies headache, dizziness, visual changes, chest pain, SOB/LUNA, abdominal pain, nausea, vomiting, diarrhea, dysuria, numbness or tingling.  LBP 2/8    Therapy--Motivated and engaging  Muscle spasms reduced  ROM improved on all extremitiews      Vital Signs Last 24 Hrs  T(C): 36.7 (08 Feb 2023 08:06), Max: 36.7 (08 Feb 2023 08:06)  T(F): 98.1 (08 Feb 2023 08:06), Max: 98.1 (08 Feb 2023 08:06)  HR: 91 (08 Feb 2023 08:06) (90 - 94)  BP: 102/68 (08 Feb 2023 08:06) (102/68 - 126/77)  BP(mean): --  RR: 16 (08 Feb 2023 08:06) (16 - 16)  SpO2: 95% (08 Feb 2023 08:06) (94% - 95%)    PHYSICAL EXAM:  Gen - Comfortable  HEENT - NCAT, EOMI, MMM  Neck - Supple, No limited ROM  Pulm - CTAB, No wheeze, No rhonchi, No crackles  Cardiovascular - RRR, S1S2  Chest - good chest expansion, good respiratory effort  Abdomen - Soft, NT/ND, +BS  Extremities - No Cyanosis, no clubbing, no edema, no calf tenderness    Neuro-      Cognitive - awake, alert, oriented to person, place, date, year, and situation.  Able to follow command     Communication - Fluent     Attention: Intact,      Judgement: Good evidence of judgement     Cranial Nerves - CN 2-12 intact.     Motor -                     LEFT    UE - 4+/5                    RIGHT UE - 4+/5                    LEFT    LE - 4-/5                    RIGHT LE - 3+/5        Sensory - Intact to LT      Reflexes - DTR Intact, No primitive reflexive  Skin:  thoracic spine incision/scar unremarkable, Right lower flank surgical area, unremarkable, skin edges together      LAB                        9.8    6.50  )-----------( 418      ( 06 Feb 2023 06:20 )             30.3     02-06    137  |  102  |  14  ----------------------------<  149<H>  3.9   |  29  |  0.92    Ca    8.7      06 Feb 2023 06:20    TPro  6.1  /  Alb  2.7<L>  /  TBili  0.8  /  DBili  x   /  AST  33  /  ALT  43  /  AlkPhos  119  02-06    LIVER FUNCTIONS - ( 06 Feb 2023 06:20 )  Alb: 2.7 g/dL / Pro: 6.1 g/dL / ALK PHOS: 119 U/L / ALT: 43 U/L / AST: 33 U/L / GGT: x             MEDICATIONS  (STANDING):  atorvastatin 80 milliGRAM(s) Oral at bedtime  cholecalciferol 2000 Unit(s) Oral <User Schedule>  dextrose 5%. 1000 milliLiter(s) (100 mL/Hr) IV Continuous <Continuous>  dextrose 5%. 1000 milliLiter(s) (50 mL/Hr) IV Continuous <Continuous>  dextrose 50% Injectable 25 Gram(s) IV Push once  dextrose 50% Injectable 12.5 Gram(s) IV Push once  dextrose 50% Injectable 25 Gram(s) IV Push once  famotidine    Tablet 20 milliGRAM(s) Oral daily  glucagon  Injectable 1 milliGRAM(s) IntraMuscular once  heparin   Injectable 5000 Unit(s) SubCutaneous every 12 hours  insulin lispro (ADMELOG) corrective regimen sliding scale   SubCutaneous two times a day before meals  lidocaine   4% Patch 1 Patch Transdermal <User Schedule>  lisinopril 10 milliGRAM(s) Oral daily  metFORMIN 1000 milliGRAM(s) Oral two times a day with meals  metoprolol tartrate 25 milliGRAM(s) Oral two times a day  tamsulosin 0.4 milliGRAM(s) Oral at bedtime  tiZANidine 2 milliGRAM(s) Oral at bedtime    MEDICATIONS  (PRN):  acetaminophen     Tablet .. 650 milliGRAM(s) Oral every 6 hours PRN Temp greater or equal to 38C (100.4F), Mild Pain (1 - 3)  dextrose Oral Gel 15 Gram(s) Oral once PRN Blood Glucose LESS THAN 70 milliGRAM(s)/deciliter  oxyCODONE    IR 5 milliGRAM(s) Oral every 6 hours PRN Moderate Pain (4 - 6)  polyethylene glycol 3350 17 Gram(s) Oral two times a day PRN Constipation   CC: Lumbar Radiculopathy    Today's Subjective & Objective Findings:   Patient seen and examined at bedside this AM, with Dr. Noriega.   Admits to sleeping well last night.  Her reports reduced intensity of spasm over back muscles with Tizanidine  Last Bm on 2/8, per patient.  He is excited to go home 2/10.   No other complaints at this time.    ROS  Denies headache, dizziness, visual changes, chest pain, SOB/LUNA, abdominal pain, nausea, vomiting, diarrhea, dysuria, numbness or tingling.    Therapy--Motivated and engaging  Muscle spasms reduced  ROM improved on all extremities      Vital Signs Last 24 Hrs  T(C): 36.6 (08 Feb 2023 20:31), Max: 36.6 (08 Feb 2023 20:31)  T(F): 97.9 (08 Feb 2023 20:31), Max: 97.9 (08 Feb 2023 20:31)  HR: 89 (09 Feb 2023 05:47) (86 - 96)  BP: 108/69 (09 Feb 2023 05:47) (106/65 - 111/70)  BP(mean): --  RR: 15 (08 Feb 2023 20:31) (15 - 15)  SpO2: 96% (08 Feb 2023 20:31) (96% - 96%)      PHYSICAL EXAM:  Gen - Comfortable  HEENT - NCAT, EOMI, MMM  Neck - Supple, No limited ROM  Pulm - CTAB, No wheeze, No rhonchi, No crackles  Cardiovascular - RRR, S1S2  Chest - good chest expansion, good respiratory effort  Abdomen - Soft, NT/ND, +BS  Extremities - No Cyanosis, no clubbing, no edema, no calf tenderness    Neuro-      Cognitive - awake, alert, oriented to person, place, date, year, and situation.  Able to follow command     Communication - Fluent     Attention: Intact,      Judgement: Good evidence of judgement     Cranial Nerves - CN 2-12 intact.     Motor -                     LEFT    UE - 4+/5                    RIGHT UE - 4+/5                    LEFT    LE - 4-/5                    RIGHT LE - 3+/5        Sensory - Intact to LT      Reflexes - DTR Intact, No primitive reflexive  Skin:  thoracic spine incision/scar unremarkable, Right lower flank surgical area, unremarkable, skin edges together      LAB    02-09    140  |  101  |  16  ----------------------------<  132<H>  4.2   |  29  |  0.82    Ca    9.1      09 Feb 2023 06:30    TPro  6.3  /  Alb  2.9<L>  /  TBili  0.8  /  DBili  x   /  AST  31  /  ALT  36  /  AlkPhos  121<H>  02-09    LIVER FUNCTIONS - ( 09 Feb 2023 06:30 )  Alb: 2.9 g/dL / Pro: 6.3 g/dL / ALK PHOS: 121 U/L / ALT: 36 U/L / AST: 31 U/L / GGT: x             MEDICATIONS  (STANDING):  atorvastatin 80 milliGRAM(s) Oral at bedtime  cholecalciferol 2000 Unit(s) Oral <User Schedule>  dextrose 5%. 1000 milliLiter(s) (100 mL/Hr) IV Continuous <Continuous>  dextrose 5%. 1000 milliLiter(s) (50 mL/Hr) IV Continuous <Continuous>  dextrose 50% Injectable 25 Gram(s) IV Push once  dextrose 50% Injectable 12.5 Gram(s) IV Push once  dextrose 50% Injectable 25 Gram(s) IV Push once  famotidine    Tablet 20 milliGRAM(s) Oral daily  glucagon  Injectable 1 milliGRAM(s) IntraMuscular once  heparin   Injectable 5000 Unit(s) SubCutaneous every 12 hours  insulin lispro (ADMELOG) corrective regimen sliding scale   SubCutaneous two times a day before meals  lidocaine   4% Patch 1 Patch Transdermal <User Schedule>  lisinopril 10 milliGRAM(s) Oral daily  metFORMIN 1000 milliGRAM(s) Oral two times a day with meals  metoprolol tartrate 25 milliGRAM(s) Oral two times a day  tamsulosin 0.4 milliGRAM(s) Oral at bedtime  tiZANidine 2 milliGRAM(s) Oral at bedtime    MEDICATIONS  (PRN):  acetaminophen     Tablet .. 650 milliGRAM(s) Oral every 6 hours PRN Temp greater or equal to 38C (100.4F), Mild Pain (1 - 3)  dextrose Oral Gel 15 Gram(s) Oral once PRN Blood Glucose LESS THAN 70 milliGRAM(s)/deciliter  oxyCODONE    IR 5 milliGRAM(s) Oral every 6 hours PRN Moderate Pain (4 - 6)  polyethylene glycol 3350 17 Gram(s) Oral two times a day PRN Constipation   CC: Lumbar Radiculopathy    Today's Subjective & Objective Findings:   Patient seen and examined at bedside this AM, with Dr. Noriega.   Admits to sleeping well last night.  Pain controlled  Last Bm on 2/8, per patient.  He is excited to go home 2/10.   No other complaints at this time.    ROS  Denies headache, dizziness, visual changes, chest pain, SOB/LUNA, abdominal pain, nausea, vomiting, diarrhea, dysuria, numbness or tingling.  Therapy--Motivated and engaging  Muscle spasms reduced  ROM improved on all extremities    Vital Signs Last 24 Hrs  T(C): 36.6 (08 Feb 2023 20:31), Max: 36.6 (08 Feb 2023 20:31)  T(F): 97.9 (08 Feb 2023 20:31), Max: 97.9 (08 Feb 2023 20:31)  HR: 89 (09 Feb 2023 05:47) (86 - 96)  BP: 108/69 (09 Feb 2023 05:47) (106/65 - 111/70)  RR: 15 (08 Feb 2023 20:31) (15 - 15)  SpO2: 96% (08 Feb 2023 20:31) (96% - 96%)    PHYSICAL EXAM:  Gen - Comfortable  HEENT - NCAT, EOMI, MMM  Neck - Supple, No limited ROM  Pulm - CTAB, No wheeze, No rhonchi, No crackles  Cardiovascular - RRR, S1S2  Chest - good chest expansion, good respiratory effort  Abdomen - Soft, NT/ND, +BS  Extremities - No Cyanosis, no clubbing, no edema, no calf tenderness    Neuro-   No overt cognitive deficit     Cranial Nerves - CN 2-12 intact.     Motor -                     LEFT    UE - 4+/5                    RIGHT UE - 4+/5                    LEFT    LE - 4-/5                    RIGHT LE - 3+/5        Sensory - Intact to LT      Reflexes - DTR Intact, No primitive reflexive  Skin:  thoracic spine incision/scar unremarkable, Right lower flank surgical area, unremarkable, skin edges together    02-09    140  |  101  |  16  ----------------------------<  132<H>  4.2   |  29  |  0.82    Ca    9.1      09 Feb 2023 06:30    TPro  6.3  /  Alb  2.9<L>  /  TBili  0.8  /  DBili  x   /  AST  31  /  ALT  36  /  AlkPhos  121<H>  02-09    LIVER FUNCTIONS - ( 09 Feb 2023 06:30 )  Alb: 2.9 g/dL / Pro: 6.3 g/dL / ALK PHOS: 121 U/L / ALT: 36 U/L / AST: 31 U/L / GGT: x             MEDICATIONS  (STANDING):  atorvastatin 80 milliGRAM(s) Oral at bedtime  cholecalciferol 2000 Unit(s) Oral <User Schedule>  dextrose 5%. 1000 milliLiter(s) (100 mL/Hr) IV Continuous <Continuous>  dextrose 5%. 1000 milliLiter(s) (50 mL/Hr) IV Continuous <Continuous>  dextrose 50% Injectable 25 Gram(s) IV Push once  dextrose 50% Injectable 12.5 Gram(s) IV Push once  dextrose 50% Injectable 25 Gram(s) IV Push once  famotidine    Tablet 20 milliGRAM(s) Oral daily  glucagon  Injectable 1 milliGRAM(s) IntraMuscular once  heparin   Injectable 5000 Unit(s) SubCutaneous every 12 hours  insulin lispro (ADMELOG) corrective regimen sliding scale   SubCutaneous two times a day before meals  lidocaine   4% Patch 1 Patch Transdermal <User Schedule>  lisinopril 10 milliGRAM(s) Oral daily  metFORMIN 1000 milliGRAM(s) Oral two times a day with meals  metoprolol tartrate 25 milliGRAM(s) Oral two times a day  tamsulosin 0.4 milliGRAM(s) Oral at bedtime  tiZANidine 2 milliGRAM(s) Oral at bedtime    MEDICATIONS  (PRN):  acetaminophen     Tablet .. 650 milliGRAM(s) Oral every 6 hours PRN Temp greater or equal to 38C (100.4F), Mild Pain (1 - 3)  dextrose Oral Gel 15 Gram(s) Oral once PRN Blood Glucose LESS THAN 70 milliGRAM(s)/deciliter  oxyCODONE    IR 5 milliGRAM(s) Oral every 6 hours PRN Moderate Pain (4 - 6)  polyethylene glycol 3350 17 Gram(s) Oral two times a day PRN Constipation

## 2023-02-09 NOTE — PROGRESS NOTE ADULT - SUBJECTIVE AND OBJECTIVE BOX
Patient is a 87y old  Male who presents with a chief complaint of Lumbar Radiculopathy (09 Feb 2023 07:59)      Patient seen and examined at bedside.  No overnight events  No complaints this morning    1 - 3 elements + 1 ROS    ALLERGIES:  clindamycin (Unknown)  gentamicin (Unknown)    MEDICATIONS  (STANDING):  atorvastatin 80 milliGRAM(s) Oral at bedtime  cholecalciferol 2000 Unit(s) Oral <User Schedule>  dextrose 5%. 1000 milliLiter(s) (50 mL/Hr) IV Continuous <Continuous>  dextrose 5%. 1000 milliLiter(s) (100 mL/Hr) IV Continuous <Continuous>  dextrose 50% Injectable 25 Gram(s) IV Push once  dextrose 50% Injectable 12.5 Gram(s) IV Push once  dextrose 50% Injectable 25 Gram(s) IV Push once  famotidine    Tablet 20 milliGRAM(s) Oral daily  glucagon  Injectable 1 milliGRAM(s) IntraMuscular once  heparin   Injectable 5000 Unit(s) SubCutaneous every 12 hours  lidocaine   4% Patch 1 Patch Transdermal <User Schedule>  lisinopril 10 milliGRAM(s) Oral daily  metFORMIN 1000 milliGRAM(s) Oral two times a day with meals  metoprolol tartrate 12.5 milliGRAM(s) Oral two times a day  senna 2 Tablet(s) Oral at bedtime  tamsulosin 0.4 milliGRAM(s) Oral at bedtime  tiZANidine 2 milliGRAM(s) Oral at bedtime    MEDICATIONS  (PRN):  acetaminophen     Tablet .. 650 milliGRAM(s) Oral every 6 hours PRN Temp greater or equal to 38C (100.4F), Mild Pain (1 - 3)  dextrose Oral Gel 15 Gram(s) Oral once PRN Blood Glucose LESS THAN 70 milliGRAM(s)/deciliter  oxyCODONE    IR 5 milliGRAM(s) Oral every 6 hours PRN Moderate Pain (4 - 6)  polyethylene glycol 3350 17 Gram(s) Oral two times a day PRN Constipation    Vital Signs Last 24 Hrs  T(F): 97.9 (08 Feb 2023 20:31), Max: 97.9 (08 Feb 2023 20:31)  HR: 89 (09 Feb 2023 05:47) (86 - 96)  BP: 108/69 (09 Feb 2023 05:47) (106/65 - 111/70)  RR: 15 (08 Feb 2023 20:31) (15 - 15)  SpO2: 96% (08 Feb 2023 20:31) (96% - 96%)  I&O's Summary        PHYSICAL EXAM:  GENERAL: NAD  HENT:  Atraumatic, Normocephalic; No tonsillar erythema, exudates, or enlargement; Moist mucous membranes;   EYES: EOMI, PERRLA, conjunctiva and sclera clear, no lid-lag  NECK: Supple, No JVD, Normal thyroid  CHEST/LUNG: Clear to percussion bilaterally; No rales, rhonchi, wheezing, or rubs; normal respiratory effort, no intercostal retractions  HEART: Regular rate and rhythm; No murmurs, rubs, or gallops; No pitting edema  ABDOMEN: Soft, Nontender, Nondistended; Bowel sounds present; No HSM  MUSCULOSKELETAL/EXTREMITIES:  2+ Peripheral Pulses, No clubbing or digital cyanosis  PSYCH: Appropriate affect, Alert & Awake; Good judgement    LABS:                        10.1   4.77  )-----------( 475      ( 09 Feb 2023 06:30 )             31.6       02-09    140  |  101  |  16  ----------------------------<  132  4.2   |  29  |  0.82    Ca    9.1      09 Feb 2023 06:30    TPro  6.3  /  Alb  2.9  /  TBili  0.8  /  DBili  x   /  AST  31  /  ALT  36  /  AlkPhos  121  02-09                                      COVID-19 PCR: [place] (01-27-23 @ 19:06)      Care Discussed with Rehab Attending and Other Providers   Patient is a 87y old  Male who presents with a chief complaint of Lumbar Radiculopathy (09 Feb 2023 07:59)      Patient seen and examined at bedside.  No overnight events  No complaints this morning    ALLERGIES:  clindamycin (Unknown)  gentamicin (Unknown)    MEDICATIONS  (STANDING):  atorvastatin 80 milliGRAM(s) Oral at bedtime  cholecalciferol 2000 Unit(s) Oral <User Schedule>  dextrose 5%. 1000 milliLiter(s) (50 mL/Hr) IV Continuous <Continuous>  dextrose 5%. 1000 milliLiter(s) (100 mL/Hr) IV Continuous <Continuous>  dextrose 50% Injectable 25 Gram(s) IV Push once  dextrose 50% Injectable 12.5 Gram(s) IV Push once  dextrose 50% Injectable 25 Gram(s) IV Push once  famotidine    Tablet 20 milliGRAM(s) Oral daily  glucagon  Injectable 1 milliGRAM(s) IntraMuscular once  heparin   Injectable 5000 Unit(s) SubCutaneous every 12 hours  lidocaine   4% Patch 1 Patch Transdermal <User Schedule>  lisinopril 10 milliGRAM(s) Oral daily  metFORMIN 1000 milliGRAM(s) Oral two times a day with meals  metoprolol tartrate 12.5 milliGRAM(s) Oral two times a day  senna 2 Tablet(s) Oral at bedtime  tamsulosin 0.4 milliGRAM(s) Oral at bedtime  tiZANidine 2 milliGRAM(s) Oral at bedtime    MEDICATIONS  (PRN):  acetaminophen     Tablet .. 650 milliGRAM(s) Oral every 6 hours PRN Temp greater or equal to 38C (100.4F), Mild Pain (1 - 3)  dextrose Oral Gel 15 Gram(s) Oral once PRN Blood Glucose LESS THAN 70 milliGRAM(s)/deciliter  oxyCODONE    IR 5 milliGRAM(s) Oral every 6 hours PRN Moderate Pain (4 - 6)  polyethylene glycol 3350 17 Gram(s) Oral two times a day PRN Constipation    Vital Signs Last 24 Hrs  T(F): 97.9 (08 Feb 2023 20:31), Max: 97.9 (08 Feb 2023 20:31)  HR: 89 (09 Feb 2023 05:47) (86 - 96)  BP: 108/69 (09 Feb 2023 05:47) (106/65 - 111/70)  RR: 15 (08 Feb 2023 20:31) (15 - 15)  SpO2: 96% (08 Feb 2023 20:31) (96% - 96%)  I&O's Summary    PHYSICAL EXAM:  GENERAL: NAD  HENT:  Atraumatic, Normocephalic; No tonsillar erythema, exudates, or enlargement; Moist mucous membranes;   EYES: EOMI, PERRLA, conjunctiva and sclera clear, no lid-lag  NECK: Supple, No JVD, Normal thyroid  CHEST/LUNG: Clear to percussion bilaterally; No rales, rhonchi, wheezing, or rubs; normal respiratory effort, no intercostal retractions  HEART: Regular rate and rhythm; No murmurs, rubs, or gallops; No pitting edema  ABDOMEN: Soft, Nontender, Nondistended; Bowel sounds present; No HSM  MUSCULOSKELETAL/EXTREMITIES:  2+ Peripheral Pulses, No clubbing or digital cyanosis  PSYCH: Appropriate affect, Alert & Awake; Good judgement    LABS:                        10.1   4.77  )-----------( 475      ( 09 Feb 2023 06:30 )             31.6       02-09    140  |  101  |  16  ----------------------------<  132  4.2   |  29  |  0.82    Ca    9.1      09 Feb 2023 06:30    TPro  6.3  /  Alb  2.9  /  TBili  0.8  /  DBili  x   /  AST  31  /  ALT  36  /  AlkPhos  121  02-09     COVID-19 PCR: NotDetec (01-27-23 @ 19:06)    Care Discussed with Rehab Attending and Other Providers

## 2023-02-09 NOTE — DISCHARGE NOTE NURSING/CASE MANAGEMENT/SOCIAL WORK - PATIENT PORTAL LINK FT
You can access the FollowMyHealth Patient Portal offered by [facility] by registering at the following website: [email or web address]. By joining [facility]’s FollowMyHealth portal, you will also be able to view your health information using other applications (apps) compatible with our system. Alert

## 2023-02-09 NOTE — DISCHARGE NOTE NURSING/CASE MANAGEMENT/SOCIAL WORK - NSDCPEFALRISK_GEN_ALL_CORE
For information on Fall & Injury Prevention, visit: [email or web address] OR  [email or web address] OR  https://www.cdc.gov/steadi/patient.html

## 2023-02-10 VITALS
DIASTOLIC BLOOD PRESSURE: 62 MMHG | SYSTOLIC BLOOD PRESSURE: 100 MMHG | RESPIRATION RATE: 16 BRPM | TEMPERATURE: 98 F | HEART RATE: 95 BPM | OXYGEN SATURATION: 95 %

## 2023-02-10 PROCEDURE — 99232 SBSQ HOSP IP/OBS MODERATE 35: CPT

## 2023-02-10 PROCEDURE — 99238 HOSP IP/OBS DSCHRG MGMT 30/<: CPT

## 2023-02-10 RX ADMIN — LISINOPRIL 10 MILLIGRAM(S): 2.5 TABLET ORAL at 05:49

## 2023-02-10 RX ADMIN — LIDOCAINE 1 PATCH: 4 CREAM TOPICAL at 05:50

## 2023-02-10 RX ADMIN — HEPARIN SODIUM 5000 UNIT(S): 5000 INJECTION INTRAVENOUS; SUBCUTANEOUS at 05:49

## 2023-02-10 RX ADMIN — METFORMIN HYDROCHLORIDE 1000 MILLIGRAM(S): 850 TABLET ORAL at 08:46

## 2023-02-10 RX ADMIN — Medication 12.5 MILLIGRAM(S): at 05:50

## 2023-02-10 RX ADMIN — Medication 2000 UNIT(S): at 08:46

## 2023-02-10 NOTE — PROGRESS NOTE ADULT - NUTRITIONAL ASSESSMENT
Diet, Consistent Carbohydrate/No Snacks:   DASH/TLC {Sodium & Cholesterol Restricted}  Supplement Feeding Modality:  Oral  Glucerna Shake Cans or Servings Per Day:  1       Frequency:  Daily (01-30-23 @ 13:53) [Active]
Diet, Regular (01-27-23 @ 19:00) [Active]
Diet, Consistent Carbohydrate/No Snacks:   DASH/TLC {Sodium & Cholesterol Restricted}  Supplement Feeding Modality:  Oral  Glucerna Shake Cans or Servings Per Day:  1       Frequency:  Daily (01-30-23 @ 13:53) [Active]
Diet, Regular (01-27-23 @ 19:00) [Active]

## 2023-02-10 NOTE — PROGRESS NOTE ADULT - ASSESSMENT
88 y/o M with PMH of HTN, HLD, Nonobstructive CAD, prostate cancer s/p RT, T2DM, Arthritis s/p Right total knee replacement in 2008, squamous cell carcinoma of scalp, BPH, ASHD, Chronic lower back pain secondary to Lumbar stenosis with neurogenic claudication, COVID 2020 who presented to [facility] on 1/24 with c/o lower back pain radiating down to B/L legs that has progressively worsened and has failed conservation treatments. MRI of lumbar spine revealed  Convex left curvature with straightening of the lordosis, multilevel loss of disc signal and height and anterior spurring and bulging from L2-L3 through L5-S1. He is s/p right posterior spine L3-L5 extreme lateral lumbar interbody fusion, L2-S1 posterior column osteotomy, facetectomy, foraminotomy, L3-L5 posterior instrumented fusion paraspinous muscle flap wound closure on 1/24. Patient now with gait Instability, ADL impairments and Functional impairments.    #Lumbar Radiculopathy s/p L3-L5 extreme lateral lumbar interbody fusion, L2-S1 posterior column osteotomy, facetectomy, foraminotomy, L3-L5 posterior instrumented fusion paraspinous muscle flap wound closure on 1/24  -continue comprehensive rehab program   -Pain management, bowel regimen per rehab     # Anemia  - no active s/s bleeding, likely post op anemia   - continue to monitor    #HLD  -Continue Lipitor     #HTN  -Continue Lisinopril and Metoprolol    #DM II and hyperglycemia  -A1c 8.5  -D/C ISS, FS, and lantus  -Continue Metformin 1000 mg BID    #BPH  -Continue Flomax     #Transaminitis - resolved    #DVT ppx - HSQ  #GI ppx - Pepcid

## 2023-02-10 NOTE — PROGRESS NOTE ADULT - REASON FOR ADMISSION
Lumbar Radiculopathy

## 2023-02-10 NOTE — PROGRESS NOTE ADULT - ASSESSMENT
88 y/o male with PMH of HTN, HLD,  Nonobstructive CAD, prostate cancer s/p RT,  DM II, Arthritis s/p Right total knee replacement in 2008, squamous cell carcinoma of scalp, BPH, ASHD, Chronic lower back pain secondary to Lumbar stenosis with neurogenic claudication, COVID 2020 who presented to [facility] on 1/24 with c/o  lower back pain radiating down to B/L legs that has  progressively worsened and has failed conservation treatments.    MRI of lumbar spine revealed  Convex left curvature with straightening of the lordosis, multilevel loss of disc signal and height and anterior spurring and bulging from L2-L3 through L5-S1. He is s/p right posterior spine  L3-L5 extreme lateral lumbar interbody fusion, L2-S1 posterior column osteotomy, facetectomy, foraminotomy, L3-L5 posterior instrumented fusion  paraspineous muscle flap wound closure on 1/24.     DC home today    #Lumbar Radiculopathy  - MRI of lumbar spine revealed  Convex left curvature with straightening of the lordosis, multilevel loss of disc signal and height and anterior spurring and bulging from L2-L3 through L5-S1.   - now s/p   L3-L5 extreme lateral lumbar interbody fusion, L2-S1 posterior column osteotomy, facetectomy, foraminotomy, L3-L5 posterior instrumented fusion  paraspineous muscle flap wound closure on 1/24.  - Surgical site with dermabond- well approximated  --Continue PT/OT    #HLD  - Lipitor 80mg daily    #HTN  - Lisinopril  10mg daily  - Metoprolol 25mg BID    #DM II  - ISS and FS  - Admelog and Lantus    #BPH  - Flomax 0.4mg daily    #Pain management  - Tylenol PRN, Oxycodone PRN, Zanaflex 2mg prn  - Warm or cold compress  - Tizanidine 2 mg @ HS   - Lidocaine patch daily     #DVT ppx  -  Heparin, SCD, TEDs    #GI ppx  - Pepcid 20mg    #Bowel Regimen  - Senna DCd  - Miralax BID PRN 2/6  - Bowel regimen adjustment per hospitalist 2/6    #Bladder management  --Voiding appropriately    #FEN   - Diet: Regular. Cardiac  - Supplements vit D, calcium    #Skin:  - Skin Thoracic spine incision + 2 drain site with surgiglue,, healing Right flank incision with surgiglue BRANDI    #Sleep:   - Maintain quiet hours and low stim environment.  - Melatonin Dcd on 2/2  -- Tizanidine 2 mg @ HS    #Precaution  - Fall, Aspiration, cardiac, thoracic spine    IDT conference on 2/9  TDD: 2/10 to home  Barriers: --  Social Work: Lives in  with [employer] with 1st FL setup. Independent PTA.  OT: Mod I/ supervision for dressing. Mod A for tub shower. Family aware.   PT: Mod I. Ready for home DC.   SLP: --n/a   ----------------------------------------------------------------  Outpatient Follow-up (Specialty/Name of physician):  PCP: Dr. Tamela Mckeon MD  Plastic Surgery  [address] [id number] [phone]    Leo Smith MD  Neurosurgery  [address] [place] [address]  [address]  [place]. [address]  [phone]  ----------------------------------------------------------------

## 2023-02-10 NOTE — PROGRESS NOTE ADULT - PROVIDER SPECIALTY LIST ADULT
Hospitalist
Physiatry
Rehab Medicine
Hospitalist
Rehab Medicine
Rehab Medicine
Hospitalist
Physiatry
Rehab Medicine
Hospitalist
Rehab Medicine

## 2023-02-10 NOTE — PROGRESS NOTE ADULT - SUBJECTIVE AND OBJECTIVE BOX
Patient is a 87y old  Male who presents with a chief complaint of Lumbar Radiculopathy (10 Feb 2023 09:20)      Patient seen and examined at bedside.  No overnight events  No complaints this morning. Excited to leave    ALLERGIES:  clindamycin (Unknown)  gentamicin (Unknown)    MEDICATIONS  (STANDING):  atorvastatin 80 milliGRAM(s) Oral at bedtime  cholecalciferol 2000 Unit(s) Oral <User Schedule>  dextrose 5%. 1000 milliLiter(s) (100 mL/Hr) IV Continuous <Continuous>  dextrose 5%. 1000 milliLiter(s) (50 mL/Hr) IV Continuous <Continuous>  dextrose 50% Injectable 25 Gram(s) IV Push once  dextrose 50% Injectable 12.5 Gram(s) IV Push once  dextrose 50% Injectable 25 Gram(s) IV Push once  famotidine    Tablet 20 milliGRAM(s) Oral daily  glucagon  Injectable 1 milliGRAM(s) IntraMuscular once  heparin   Injectable 5000 Unit(s) SubCutaneous every 12 hours  lidocaine   4% Patch 1 Patch Transdermal <User Schedule>  lisinopril 10 milliGRAM(s) Oral daily  metFORMIN 1000 milliGRAM(s) Oral two times a day with meals  metoprolol tartrate 12.5 milliGRAM(s) Oral two times a day  senna 2 Tablet(s) Oral at bedtime  tamsulosin 0.4 milliGRAM(s) Oral at bedtime  tiZANidine 2 milliGRAM(s) Oral at bedtime    MEDICATIONS  (PRN):  acetaminophen     Tablet .. 650 milliGRAM(s) Oral every 6 hours PRN Temp greater or equal to 38C (100.4F), Mild Pain (1 - 3)  dextrose Oral Gel 15 Gram(s) Oral once PRN Blood Glucose LESS THAN 70 milliGRAM(s)/deciliter  oxyCODONE    IR 5 milliGRAM(s) Oral every 6 hours PRN Moderate Pain (4 - 6)  polyethylene glycol 3350 17 Gram(s) Oral two times a day PRN Constipation    Vital Signs Last 24 Hrs  T(F): 98.2 (10 Feb 2023 09:16), Max: 98.2 (10 Feb 2023 09:16)  HR: 95 (10 Feb 2023 09:16) (80 - 95)  BP: 100/62 (10 Feb 2023 09:16) (100/62 - 115/72)  RR: 16 (10 Feb 2023 09:16) (15 - 16)  SpO2: 95% (10 Feb 2023 09:16) (94% - 96%)  I&O's Summary    PHYSICAL EXAM:  GENERAL: NAD  HENT:  Atraumatic, Normocephalic; No tonsillar erythema, exudates, or enlargement; Moist mucous membranes;   EYES: EOMI, PERRLA, conjunctiva and sclera clear, no lid-lag  NECK: Supple, No JVD, Normal thyroid  CHEST/LUNG: Clear to percussion bilaterally; No rales, rhonchi, wheezing, or rubs; normal respiratory effort, no intercostal retractions  HEART: Regular rate and rhythm; No murmurs, rubs, or gallops; No pitting edema  ABDOMEN: Soft, Nontender, Nondistended; Bowel sounds present; No HSM  MUSCULOSKELETAL/EXTREMITIES:  2+ Peripheral Pulses, No clubbing or digital cyanosis  PSYCH: Appropriate affect, Alert & Awake; Good judgement    LABS:                        10.1   4.77  )-----------( 475      ( 09 Feb 2023 06:30 )             31.6       02-09    140  |  101  |  16  ----------------------------<  132  4.2   |  29  |  0.82    Ca    9.1      09 Feb 2023 06:30    TPro  6.3  /  Alb  2.9  /  TBili  0.8  /  DBili  x   /  AST  31  /  ALT  36  /  AlkPhos  121  02-09    COVID-19 PCR: Cecilio (01-27-23 @ 19:06)      Care Discussed with Rehab Attending and Other Providers

## 2023-02-10 NOTE — PROGRESS NOTE ADULT - SUBJECTIVE AND OBJECTIVE BOX
CC: Lumbar Radiculopathy    Today's Subjective & Objective Findings:   Patient seen and examined   No med complaint  Nursing staff noted that patient have gone to bathroom unaccompanied this AM  Safety precautions and need for assistance for fall prevention discussed and understood by patient  Admits to sleeping well last night.  Pain controlled requiring oxycodone once/day nightly,aim to d/c in few days post discharge  Tinzanidine tolerated contiue f/u with PCP and will d/c during out patient with sustained control of muscle spasms  Last Bm on 2/10, per patient.  He is excited to go home today  Confirmed his pharmacy details [facility] [address], [place]  Family will be picking him later today      ROS  Denies headache, dizziness, visual changes, chest pain, SOB/LUNA, abdominal pain, nausea, vomiting, diarrhea, dysuria, numbness or tingling.  Muscle spasms reduced  ROM improved on all extremities    Vital Signs Last 24 Hrs  T(C): 36.8 (10 Feb 2023 09:16), Max: 36.8 (10 Feb 2023 09:16)  T(F): 98.2 (10 Feb 2023 09:16), Max: 98.2 (10 Feb 2023 09:16)  HR: 95 (10 Feb 2023 09:16) (80 - 95)  BP: 100/62 (10 Feb 2023 09:16) (100/62 - 115/72)  RR: 16 (10 Feb 2023 09:16) (15 - 16)  SpO2: 95% (10 Feb 2023 09:16) (94% - 96%)  O2 Parameters below as of 10 Feb 2023 09:16  Patient On (Oxygen Delivery Method): room air      PHYSICAL EXAM:  Gen - Comfortable  HEENT - NAD  Neck - Supple, No limited ROM  Pulm - CTAB, No wheeze, No rhonchi, No crackles  Cardiovascular - RRR, S1S2  Chest - good chest expansion, good respiratory effort  Abdomen - Soft, NT/ND, +BS  Extremities - No Cyanosis, no clubbing, no edema, no calf tenderness    Neuro-   No overt cognitive deficit     Cranial Nerves - CN 2-12 intact.     Motor -  4+/5    Sensation normal     Reflexes - DTR Intact,  Skin:  thoracic spine incision/scar unremarkable, Right lower flank surgical area, unremarkable, skin edges together    02-09    140  |  101  |  16  ----------------------------<  132<H>  4.2   |  29  |  0.82    Ca    9.1      09 Feb 2023 06:30    TPro  6.3  /  Alb  2.9<L>  /  TBili  0.8  /  DBili  x   /  AST  31  /  ALT  36  /  AlkPhos  121<H>  02-09    LIVER FUNCTIONS - ( 09 Feb 2023 06:30 )  Alb: 2.9 g/dL / Pro: 6.3 g/dL / ALK PHOS: 121 U/L / ALT: 36 U/L / AST: 31 U/L / GGT: x             MEDICATIONS  (STANDING):  atorvastatin 80 milliGRAM(s) Oral at bedtime  cholecalciferol 2000 Unit(s) Oral <User Schedule>  dextrose 5%. 1000 milliLiter(s) (100 mL/Hr) IV Continuous <Continuous>  dextrose 5%. 1000 milliLiter(s) (50 mL/Hr) IV Continuous <Continuous>  dextrose 50% Injectable 25 Gram(s) IV Push once  dextrose 50% Injectable 12.5 Gram(s) IV Push once  dextrose 50% Injectable 25 Gram(s) IV Push once  famotidine    Tablet 20 milliGRAM(s) Oral daily  glucagon  Injectable 1 milliGRAM(s) IntraMuscular once  heparin   Injectable 5000 Unit(s) SubCutaneous every 12 hours  lidocaine   4% Patch 1 Patch Transdermal <User Schedule>  lisinopril 10 milliGRAM(s) Oral daily  metFORMIN 1000 milliGRAM(s) Oral two times a day with meals  metoprolol tartrate 12.5 milliGRAM(s) Oral two times a day  senna 2 Tablet(s) Oral at bedtime  tamsulosin 0.4 milliGRAM(s) Oral at bedtime  tiZANidine 2 milliGRAM(s) Oral at bedtime    MEDICATIONS  (PRN):  acetaminophen     Tablet .. 650 milliGRAM(s) Oral every 6 hours PRN Temp greater or equal to 38C (100.4F), Mild Pain (1 - 3)  dextrose Oral Gel 15 Gram(s) Oral once PRN Blood Glucose LESS THAN 70 milliGRAM(s)/deciliter  oxyCODONE    IR 5 milliGRAM(s) Oral every 6 hours PRN Moderate Pain (4 - 6)  polyethylene glycol 3350 17 Gram(s) Oral two times a day PRN Constipation

## 2023-03-17 NOTE — H&P ADULT - NSHPLABSRESULTS_GEN_ALL_CORE
01/27/2023    Glucose: 132    Sodium: 140    Potassium: 3.6    Blood Urea Nitrogen : 13    Creatinine: 0.66      01/27/2023    WBC: 6.75    HgB: 8.6    Hct: 27.2    Platelets: 141     Cultures:     Urine culture 01/25/2023:  No growth
Unknown if ever smoked

## 2023-03-21 PROCEDURE — 83036 HEMOGLOBIN GLYCOSYLATED A1C: CPT

## 2023-03-21 PROCEDURE — 97163 PT EVAL HIGH COMPLEX 45 MIN: CPT

## 2023-03-21 PROCEDURE — 82962 GLUCOSE BLOOD TEST: CPT

## 2023-03-21 PROCEDURE — 97530 THERAPEUTIC ACTIVITIES: CPT

## 2023-03-21 PROCEDURE — 85025 COMPLETE CBC W/AUTO DIFF WBC: CPT

## 2023-03-21 PROCEDURE — 85027 COMPLETE CBC AUTOMATED: CPT

## 2023-03-21 PROCEDURE — 97112 NEUROMUSCULAR REEDUCATION: CPT

## 2023-03-21 PROCEDURE — 97110 THERAPEUTIC EXERCISES: CPT

## 2023-03-21 PROCEDURE — 80053 COMPREHEN METABOLIC PANEL: CPT

## 2023-03-21 PROCEDURE — 87635 SARS-COV-2 COVID-19 AMP PRB: CPT

## 2023-03-21 PROCEDURE — 36415 COLL VENOUS BLD VENIPUNCTURE: CPT

## 2023-03-21 PROCEDURE — 97167 OT EVAL HIGH COMPLEX 60 MIN: CPT

## 2023-03-21 PROCEDURE — 97535 SELF CARE MNGMENT TRAINING: CPT

## 2023-03-21 PROCEDURE — 97116 GAIT TRAINING THERAPY: CPT

## 2025-03-20 NOTE — DISCHARGE NOTE PROVIDER - YES NO FOR MLM POSITIVE OR NEGATIVE COVID RESULT
MRI called and let writer know that they will be able to get patient for MRI in 1.5 hours. Patient given MRI safe gown to change into.   
Paged MRI  
Patient in MRI  
,